# Patient Record
Sex: MALE | Race: WHITE | NOT HISPANIC OR LATINO | Employment: FULL TIME | URBAN - METROPOLITAN AREA
[De-identification: names, ages, dates, MRNs, and addresses within clinical notes are randomized per-mention and may not be internally consistent; named-entity substitution may affect disease eponyms.]

---

## 2017-11-22 DIAGNOSIS — R05.9 COUGH: ICD-10-CM

## 2017-11-24 RX ORDER — FLUTICASONE PROPIONATE 50 MCG
1-2 SPRAY, SUSPENSION (ML) NASAL DAILY
Qty: 48 G | Refills: 0 | Status: SHIPPED | OUTPATIENT
Start: 2017-11-24 | End: 2018-02-26

## 2017-11-24 NOTE — TELEPHONE ENCOUNTER
Routing refill request to provider for review/approval because:  Drug not on the [facility] refill protocol for indicated use (cough)  Sunita FOREMAN RN            Requested Prescriptions   Pending Prescriptions Disp Refills     fluticasone (FLONASE) 50 MCG/ACT spray 48 g 1     Sig: Spray 1-2 sprays into both nostrils daily    Inhaled Steroids Protocol Passed    11/22/2017  8:30 PM       Passed - Patient is age 12 or older       Passed - Recent or future visit with authorizing provider's specialty    Patient had office visit in the last year or has a visit in the next 30 days with authorizing provider.  See chart review.

## 2018-02-19 DIAGNOSIS — Z00.00 ROUTINE GENERAL MEDICAL EXAMINATION AT A HEALTH CARE FACILITY: Primary | ICD-10-CM

## 2018-02-19 LAB
ERYTHROCYTE [DISTWIDTH] IN BLOOD BY AUTOMATED COUNT: 15.2 % (ref 10–15)
HCT VFR BLD AUTO: 52.3 % (ref 40–53)
HGB BLD-MCNC: 17.4 G/DL (ref 13.3–17.7)
MCH RBC QN AUTO: 28.7 PG (ref 26.5–33)
MCHC RBC AUTO-ENTMCNC: 33.3 G/DL (ref 31.5–36.5)
MCV RBC AUTO: 86 FL (ref 78–100)
PLATELET # BLD AUTO: 178 10E9/L (ref 150–450)
RBC # BLD AUTO: 6.06 10E12/L (ref 4.4–5.9)
WBC # BLD AUTO: 5.8 10E9/L (ref 4–11)

## 2018-02-19 PROCEDURE — 36415 COLL VENOUS BLD VENIPUNCTURE: CPT | Performed by: INTERNAL MEDICINE

## 2018-02-19 PROCEDURE — 80061 LIPID PANEL: CPT | Performed by: INTERNAL MEDICINE

## 2018-02-19 PROCEDURE — 80053 COMPREHEN METABOLIC PANEL: CPT | Performed by: INTERNAL MEDICINE

## 2018-02-19 PROCEDURE — 85027 COMPLETE CBC AUTOMATED: CPT | Performed by: INTERNAL MEDICINE

## 2018-02-19 NOTE — PROGRESS NOTES
Dr. Pineda - this patient walked in wanting labs for his pe next week, can you order?  Lab litzy several vials.  Inna Delgadillo, CMA

## 2018-02-20 LAB
ALBUMIN SERPL-MCNC: 4.1 G/DL (ref 3.4–5)
ALP SERPL-CCNC: 69 U/L (ref 40–150)
ALT SERPL W P-5'-P-CCNC: 45 U/L (ref 0–70)
ANION GAP SERPL CALCULATED.3IONS-SCNC: 9 MMOL/L (ref 3–14)
AST SERPL W P-5'-P-CCNC: 31 U/L (ref 0–45)
BILIRUB SERPL-MCNC: 0.5 MG/DL (ref 0.2–1.3)
BUN SERPL-MCNC: 21 MG/DL (ref 7–30)
CALCIUM SERPL-MCNC: 9.1 MG/DL (ref 8.5–10.1)
CHLORIDE SERPL-SCNC: 106 MMOL/L (ref 94–109)
CHOLEST SERPL-MCNC: 163 MG/DL
CO2 SERPL-SCNC: 26 MMOL/L (ref 20–32)
CREAT SERPL-MCNC: 1.04 MG/DL (ref 0.66–1.25)
GFR SERPL CREATININE-BSD FRML MDRD: 73 ML/MIN/1.7M2
GLUCOSE SERPL-MCNC: 89 MG/DL (ref 70–99)
HDLC SERPL-MCNC: 76 MG/DL
LDLC SERPL CALC-MCNC: 79 MG/DL
NONHDLC SERPL-MCNC: 87 MG/DL
POTASSIUM SERPL-SCNC: 4.4 MMOL/L (ref 3.4–5.3)
PROT SERPL-MCNC: 7.3 G/DL (ref 6.8–8.8)
SODIUM SERPL-SCNC: 141 MMOL/L (ref 133–144)
TRIGL SERPL-MCNC: 42 MG/DL

## 2018-02-26 ENCOUNTER — OFFICE VISIT (OUTPATIENT)
Dept: FAMILY MEDICINE | Facility: CLINIC | Age: 58
End: 2018-02-26
Payer: COMMERCIAL

## 2018-02-26 VITALS
HEIGHT: 69 IN | SYSTOLIC BLOOD PRESSURE: 126 MMHG | WEIGHT: 220 LBS | DIASTOLIC BLOOD PRESSURE: 78 MMHG | BODY MASS INDEX: 32.58 KG/M2 | OXYGEN SATURATION: 97 % | TEMPERATURE: 98.2 F | HEART RATE: 78 BPM

## 2018-02-26 DIAGNOSIS — R05.9 COUGH: ICD-10-CM

## 2018-02-26 DIAGNOSIS — I77.1 SUBCLAVIAN ARTERY STENOSIS, LEFT (H): ICD-10-CM

## 2018-02-26 DIAGNOSIS — I48.92 ATRIAL FLUTTER, UNSPECIFIED TYPE (H): ICD-10-CM

## 2018-02-26 DIAGNOSIS — Z00.00 ROUTINE GENERAL MEDICAL EXAMINATION AT A HEALTH CARE FACILITY: Primary | ICD-10-CM

## 2018-02-26 DIAGNOSIS — I71.21 ASCENDING AORTIC ANEURYSM (H): ICD-10-CM

## 2018-02-26 DIAGNOSIS — I10 BENIGN ESSENTIAL HYPERTENSION: ICD-10-CM

## 2018-02-26 DIAGNOSIS — I77.810 AORTIC ROOT DILATATION (H): ICD-10-CM

## 2018-02-26 DIAGNOSIS — R05.3 CHRONIC COUGH: ICD-10-CM

## 2018-02-26 DIAGNOSIS — B07.0 PLANTAR WARTS: ICD-10-CM

## 2018-02-26 DIAGNOSIS — K21.9 GASTROESOPHAGEAL REFLUX DISEASE WITHOUT ESOPHAGITIS: ICD-10-CM

## 2018-02-26 PROCEDURE — 99396 PREV VISIT EST AGE 40-64: CPT | Performed by: INTERNAL MEDICINE

## 2018-02-26 RX ORDER — AMLODIPINE BESYLATE 5 MG/1
5 TABLET ORAL DAILY
Qty: 90 TABLET | Refills: 3 | Status: SHIPPED | OUTPATIENT
Start: 2018-02-26 | End: 2019-02-18

## 2018-02-26 NOTE — PROGRESS NOTES
SUBJECTIVE:   CC: Kelvin Zhou is an 58 year old male who presents for preventative health visit.     The patient feels fine but not working out reg and weight is up as noted.  He has plantar wart for long time right foot and wants to get it taken care of.  No cv c/o, blood pressure fine on his checks.      As noted seen at [facility] Jan 2016 for echo findings, heart block and short lived atrial flutter and she rec follow up echo and holter in 2 years.        Healthy Habits:    Do you get at least three servings of calcium containing foods daily (dairy, green leafy vegetables, etc.)? yes    Amount of exercise or daily activities, outside of work: 3 day(s) per week    Problems taking medications regularly No    Medication side effects: No    Have you had an eye exam in the past two years? yes    Do you see a dentist twice per year? yes    Do you have sleep apnea, excessive snoring or daytime drowsiness?no           Today's PHQ-2 Score:   PHQ-2 ( 1999 [employer]) 12/15/2016 12/13/2016   Q1: Little interest or pleasure in doing things 0 -   Q2: Feeling down, depressed or hopeless 0 -   PHQ-2 Score 0 -   Q1: Little interest or pleasure in doing things - Not at all   Q2: Feeling down, depressed or hopeless - Not at all   PHQ-2 Score - 0       Abuse: Current or Past(Physical, Sexual or Emotional)- No  Do you feel safe in your environment - Yes    Social History   Substance Use Topics     Smoking status: Never Smoker     Smokeless tobacco: Never Used     Alcohol use No      If you drink alcohol do you typically have >3 drinks per day or >7 drinks per week? No                Past Medical History:      Past Medical History:   Diagnosis Date     Aortic root dilatation (H) 2012    echo done 9/12 4.2 cm, ascending aorta mildy dilated at 3.9.  Nl lv fxn, mild lvh, lae, mild ai, mild mr and tr     Ascending aortic aneurysm (H) 2012    echo done 9/12 mildy dilated at 4.2.     Atrial flutter (H) 12/15    seen on holter, 5 minutes,  holter done due to prior heart surgery, also 2:1 heart block and first degree ab block     Chronic cough     for years     Cough      Diastolic dysfunction 12/15    on echo     GERD (gastroesophageal reflux disease)      Heart block 12/15    seen on holter, 2:1 and first degree     HTN (hypertension) 2007     LVH (left ventricular hypertrophy) 2006    on echo done for elev bp, fu 2010 same, mod thickening mv, mild tr, trace to mild mr; fu 2015 same as 2013     Mild aortic regurgitation      Mild mitral regurgitation     fu 2015 no change     Nephrolithiasis 2007    calcium ox     Normal colonoscopy 2014     Personal history of surgery to heart and great vessels, presenting hazards to health 7th grade    hole in heart     Subclavian artery stenosis, left (H) 2006    found due to bp difference in arms             Past Surgical History:      Past Surgical History:   Procedure Laterality Date     CARDIAC SURGERY  7th grade     hole in heart     LAPAROSCOPIC CHOLECYSTECTOMY  2/8/15    done gillian akbar gallbladder             Social History:     Social History     Social History     Marital status:      Spouse name: N/A     Number of children: 2     Years of education: N/A     Occupational History     [employer]     Social History Main Topics     Smoking status: Never Smoker     Smokeless tobacco: Never Used     Alcohol use No     Drug use: No     Sexual activity: Yes     Partners: Female     Other Topics Concern     Not on file     Social History Narrative             Family History:   reviewed         Allergies:     Allergies   Allergen Reactions     No Known Drug Allergy              Medications:     Current Outpatient Prescriptions   Medication Sig Dispense Refill     amLODIPine (NORVASC) 5 MG tablet Take 1 tablet (5 mg) by mouth daily 90 tablet 3     ranitidine (ZANTAC) 150 MG tablet Take 1 tablet (150 mg) by mouth 2 times daily 180 tablet 3     fluticasone (FLONASE) 50 MCG/ACT spray Spray 1-2  "sprays into both nostrils daily 48 g 0     aspirin 81 MG tablet Take 1 tablet (81 mg) by mouth daily 30 tablet 9     [DISCONTINUED] amLODIPine (NORVASC) 5 MG tablet Take 1 tablet (5 mg) by mouth daily 90 tablet 3               Review of Systems:   The 10 point Review of Systems is negative other than noted in the HPI           Physical Exam:   Blood pressure 126/78, pulse 78, temperature 98.2  F (36.8  C), temperature source Oral, height 5' 9\" (1.753 m), weight 220 lb (99.8 kg), SpO2 97 %.    Exam:  Constitutional: healthy appearing, alert and in no distress  Heent: Normocephalic. Head without obvious masses or lesions. PERRLDC, EOMI. Mouth exam within normal limits: tongue, mucous membranes, posterior pharynx all normal, no lesions or abnormalities seen.  Tm's and canals within normal limits bilaterally. Neck supple, no nuchal rigidity or masses. No supraclavicular, or cervical adenopathy. Thyroid symmetric, no masses.  Cardiovascular: Regular rate and rhythm, occasional extra beat, no murmer, rub or gallops.  JVP not elevated, no edema.  Carotids within normal limits bilaterally, no bruits.  Respiratory: Normal respiratory effort.  Lungs clear, normal flow, no wheezing or crackles.  Breasts: Normal bilaterally.  No masses or lesions.  Nipples within normal limites.  No axillary lesions or nodes.  Gastrointestinal: Normal active bowel sounds.   Soft, not tender, no masses, guarding or rebound.  No hepatosplenomegaly.   Genitourinary: Rectal min bph  Musculoskeletal: extremities normal, no gross deformities noted.  Skin: no suspicious lesions or rashes, has plantar wart bottom right foot that I froze with l.n today.  Neurologic: Mental status within normal limits.  Speech fluent.  No gross motor abnormalities and gait intact.  Psychiatric: mentation appears normal and affect normal.         Data:   Labs reviewed with patient         Assessment:   1. Normal complete physical exam  2. Plantars wart, follow up " podiatrist  3. Atrial flutter, no clinical recurrence, per cards rec will get holter  4. Aortic root dil, to get echo  5. Subclavian stenosis, to check blood pressure on right arm  6. Hypertension, controlled  7. Chronic cough for years, will refer to Dr. Diallo  8. Gerd, controlled  9. Obesity, weight loss         Plan:   holter  Echo  Follow up pod for wart  Exercise, diet and weight loss  See Dr. Diallo for cough  Call if problems      Shad Pineda M.D.

## 2018-02-26 NOTE — PATIENT INSTRUCTIONS
For the cough I would see Dr Diallo, [phone]    Try to exercise 4x a week and get your weight down    Call if problems    Shad Pineda M.D.      Preventive Health Recommendations  Male Ages 50 - 64    Yearly exam:             See your health care provider every year in order to  o   Review health changes.   o   Discuss preventive care.    o   Review your medicines if your doctor has prescribed any.     Have a cholesterol test every 5 years, or more frequently if you are at risk for high cholesterol/heart disease.     Have a diabetes test (fasting glucose) every three years. If you are at risk for diabetes, you should have this test more often.     Have a colonoscopy at age 50, or have a yearly FIT test (stool test). These exams will check for colon cancer.      Talk with your health care provider about whether or not a prostate cancer screening test (PSA) is right for you.    You should be tested each year for STDs (sexually transmitted diseases), if you re at risk.     Shots: Get a flu shot each year. Get a tetanus shot every 10 years.     Nutrition:    Eat at least 5 servings of fruits and vegetables daily.     Eat whole-grain bread, whole-wheat pasta and brown rice instead of white grains and rice.     Talk to your provider about Calcium and Vitamin D.     Lifestyle    Exercise for at least 150 minutes a week (30 minutes a day, 5 days a week). This will help you control your weight and prevent disease.     Limit alcohol to one drink per day.     No smoking.     Wear sunscreen to prevent skin cancer.     See your dentist every six months for an exam and cleaning.     See your eye doctor every 1 to 2 years.

## 2018-02-26 NOTE — NURSING NOTE
"Chief Complaint   Patient presents with     Physical       Initial /85  Pulse 78  Temp 98.2  F (36.8  C) (Oral)  Ht 5' 9\" (1.753 m)  Wt 220 lb (99.8 kg)  SpO2 97%  BMI 32.49 kg/m2 Estimated body mass index is 32.49 kg/(m^2) as calculated from the following:    Height as of this encounter: 5' 9\" (1.753 m).    Weight as of this encounter: 220 lb (99.8 kg).  Medication Reconciliation: complete   Mckenna Espinal CMA      "

## 2018-02-26 NOTE — TELEPHONE ENCOUNTER
Reason for Call:  Other prescription    Detailed comments: Pt called this afternoon and said that Dr. Pineda forgot to refill his flonase. Please put in a refill for that ASAP and give pt a call once this is done. Thank you.    Phone Number Patient can be reached at: Home number on file [phone] (home)    Best Time:     Can we leave a detailed message on this number? YES    Call taken on 2/26/2018 at 4:07 PM by Laura Wilson

## 2018-02-26 NOTE — MR AVS SNAPSHOT
After Visit Summary   2/26/2018    Kelvin Zhou    MRN: [id number]           Patient Information     Date Of Birth          1960        Visit Information        Provider Department      2/26/2018 3:00 PM Shad Pineda MD [facility]        Today's Diagnoses     Routine general medical examination at a health care facility    -  1    Benign essential hypertension        Gastroesophageal reflux disease without esophagitis        Subclavian artery stenosis, left (H)        Aortic root dilatation (H)        Ascending aortic aneurysm (H)        Atrial flutter, unspecified type (H)        Chronic cough          Care Instructions    For the cough I would see Dr Diallo, [phone]    Try to exercise 4x a week and get your weight down    Call if problems    Shad Pineda M.D.      Preventive Health Recommendations  Male Ages 50 - 64    Yearly exam:             See your health care provider every year in order to  o   Review health changes.   o   Discuss preventive care.    o   Review your medicines if your doctor has prescribed any.     Have a cholesterol test every 5 years, or more frequently if you are at risk for high cholesterol/heart disease.     Have a diabetes test (fasting glucose) every three years. If you are at risk for diabetes, you should have this test more often.     Have a colonoscopy at age 50, or have a yearly FIT test (stool test). These exams will check for colon cancer.      Talk with your health care provider about whether or not a prostate cancer screening test (PSA) is right for you.    You should be tested each year for STDs (sexually transmitted diseases), if you re at risk.     Shots: Get a flu shot each year. Get a tetanus shot every 10 years.     Nutrition:    Eat at least 5 servings of fruits and vegetables daily.     Eat whole-grain bread, whole-wheat pasta and brown rice instead of white grains and rice.     Talk to your provider about Calcium and  "Vitamin D.     Lifestyle    Exercise for at least 150 minutes a week (30 minutes a day, 5 days a week). This will help you control your weight and prevent disease.     Limit alcohol to one drink per day.     No smoking.     Wear sunscreen to prevent skin cancer.     See your dentist every six months for an exam and cleaning.     See your eye doctor every 1 to 2 years.            Follow-ups after your visit        Who to contact     If you have questions or need follow up information about today's clinic visit or your schedule please contact [facility] directly at [phone].  Normal or non-critical lab and imaging results will be communicated to you by [employer], letter or phone within 4 business days after the clinic has received the results. If you do not hear from us within 7 days, please contact the clinic through [employer] or phone. If you have a critical or abnormal lab result, we will notify you by phone as soon as possible.  Submit refill requests through [employer] or call your pharmacy and they will forward the refill request to us. Please allow 3 business days for your refill to be completed.          Additional Information About Your Visit        MyChart Information     [employer] gives you secure access to your electronic health record. If you see a primary care provider, you can also send messages to your care team and make appointments. If you have questions, please call your primary care clinic.  If you do not have a primary care provider, please call [phone] and they will assist you.        Care EveryWhere ID     This is your Care EveryWhere ID. This could be used by other organizations to access your [place] medical records  [id number]        Your Vitals Were     Pulse Temperature Height Pulse Oximetry BMI (Body Mass Index)       78 98.2  F (36.8  C) (Oral) 5' 9\" (1.753 m) 97% 32.49 kg/m2        Blood Pressure from Last 3 Encounters:   02/26/18 126/78   12/15/16 139/76   12/07/15 118/84 "    Weight from Last 3 Encounters:   02/26/18 220 lb (99.8 kg)   12/15/16 208 lb (94.3 kg)   12/07/15 213 lb 8 oz (96.8 kg)              Today, you had the following     No orders found for display         Today's Medication Changes          These changes are accurate as of 2/26/18  3:24 PM.  If you have any questions, ask your nurse or doctor.               Stop taking these medicines if you haven't already. Please contact your care team if you have questions.     omeprazole 20 MG CR capsule   Commonly known as:  priLOSEC   Stopped by:  Shad Pineda MD                Where to get your medicines      These medications were sent to [id number] - [facility] [address] [facility]  [address] [facility] [address]     Phone:  [phone]     amLODIPine 5 MG tablet    ranitidine 150 MG tablet                Primary Care Provider Office Phone # Fax #    Shad Pineda [phone] [address] [address]        Equal Access to Services     [facility]: Hadii aad ku hadasho Soomaali, waaxda luqadaha, qaybta kaalmada adehelenyada, kimmy mercado . So [facility] [place] [phone].    ATENCIÓN: Si habla español, tiene a segundo disposición servicios gratuitos de asistencia lingüística. [facility] [phone].    We comply with applicable federal civil rights laws and [place] laws. We do not discriminate on the basis of race, color, national origin, age, disability, sex, sexual orientation, or gender identity.            Thank you!     Thank you for choosing [facility]  for your care. Our goal is always to provide you with excellent care. Hearing back from our patients is one way we can continue to improve our services. Please take a few minutes to complete the written survey that you may receive in the mail after your visit with us. Thank you!             Your Updated Medication List - Protect others around you: Learn how to  safely use, store and throw away your medicines at www.disposemymeds.org.          This list is accurate as of 2/26/18  3:24 PM.  Always use your most recent med list.                   Brand Name Dispense Instructions for use Diagnosis    amLODIPine 5 MG tablet    NORVASC    90 tablet    Take 1 tablet (5 mg) by mouth daily    Benign essential hypertension       aspirin 81 MG tablet     30 tablet    Take 1 tablet (81 mg) by mouth daily    Subclavian artery stenosis, left (H)       fluticasone 50 MCG/ACT spray    FLONASE    48 g    Spray 1-2 sprays into both nostrils daily    Cough       ranitidine 150 MG tablet    ZANTAC    180 tablet    Take 1 tablet (150 mg) by mouth 2 times daily    Gastroesophageal reflux disease without esophagitis

## 2018-02-27 RX ORDER — FLUTICASONE PROPIONATE 50 MCG
1-2 SPRAY, SUSPENSION (ML) NASAL DAILY
Qty: 48 G | Refills: 3 | Status: SHIPPED | OUTPATIENT
Start: 2018-02-27 | End: 2020-02-17

## 2018-02-27 NOTE — TELEPHONE ENCOUNTER
"Last Written Prescription Date:  11/24/2017  Last Fill Quantity: 45g,  # refills: 0   Last office visit: 2/26/2018 with prescribing provider:     Future Office Visit:      Requested Prescriptions   Pending Prescriptions Disp Refills     fluticasone (FLONASE) 50 MCG/ACT spray 48 g 0     Sig: Spray 1-2 sprays into both nostrils daily    Inhaled Steroids Protocol Passed    2/26/2018  4:08 PM       Passed - Patient is age 12 or older       Passed - Recent or future visit with authorizing provider's specialty    Patient had office visit in the last year or has a visit in the next 30 days with authorizing provider.  See \"Patient Info\" tab in inbasket, or \"Choose Columns\" in Meds & Orders section of the refill encounter.               "

## 2018-02-27 NOTE — TELEPHONE ENCOUNTER
"Prescription approved per [facility] Refill Protocol.  LVM \"Your Rx has been sent\"     Sunita FOREMAN RN    "

## 2018-03-08 ENCOUNTER — TELEPHONE (OUTPATIENT)
Dept: FAMILY MEDICINE | Facility: CLINIC | Age: 58
End: 2018-03-08

## 2018-03-08 NOTE — TELEPHONE ENCOUNTER
Please call patient re mychart message I recently sent.  He never answered it.  Please see if ok to do those studies.    Thanks    Shad Pineda M.D.

## 2018-03-08 NOTE — TELEPHONE ENCOUNTER
Dr. Pineda:     Spoke with patient who agrees to contact his Cardiologist at [place] to schedule ECHO and Holter monitor. Asked that he do this as soon as possible, as 2 years was advised. He agreed with plan.     Asked that he reach out to us with any further questions or concerns.     Thank you,   Eva EDWARDS RN

## 2018-03-08 NOTE — TELEPHONE ENCOUNTER
Spoke with patient:     He declines the sleep apnea test, but would consider the Echo and Holter monitoring.     He is not sure whether he should do these at [place] or here, within [place]. Advised that PCP would likely order through .     Dr. Pineda, the patient states he will check his MyChart. Would you be willing to respond with advise on whether he can have this done at [place] or ?     Thank you,   Eva EDWARDS RN

## 2018-04-13 ENCOUNTER — HOSPITAL ENCOUNTER (OUTPATIENT)
Dept: CARDIOLOGY | Facility: CLINIC | Age: 58
Discharge: HOME | End: 2018-04-13
Attending: INTERNAL MEDICINE | Admitting: INTERNAL MEDICINE
Payer: COMMERCIAL

## 2018-04-13 ENCOUNTER — HOSPITAL ENCOUNTER (OUTPATIENT)
Dept: CARDIOLOGY | Facility: CLINIC | Age: 58
End: 2018-04-13
Attending: INTERNAL MEDICINE
Payer: COMMERCIAL

## 2018-04-13 DIAGNOSIS — I51.89 DIASTOLIC DYSFUNCTION: ICD-10-CM

## 2018-04-13 DIAGNOSIS — I71.21 ASCENDING AORTIC ANEURYSM (H): ICD-10-CM

## 2018-04-13 DIAGNOSIS — I34.0 MILD MITRAL REGURGITATION: ICD-10-CM

## 2018-04-13 DIAGNOSIS — I45.9 HEART BLOCK: ICD-10-CM

## 2018-04-13 PROCEDURE — 93225 XTRNL ECG REC<48 HRS REC: CPT

## 2018-04-13 PROCEDURE — 93227 XTRNL ECG REC<48 HR R&I: CPT | Performed by: INTERNAL MEDICINE

## 2018-04-13 PROCEDURE — 93306 TTE W/DOPPLER COMPLETE: CPT

## 2018-04-13 PROCEDURE — 93306 TTE W/DOPPLER COMPLETE: CPT | Mod: 26 | Performed by: INTERNAL MEDICINE

## 2018-04-20 LAB — INTERPRETATION MONITOR -MUSE: NORMAL

## 2018-04-22 ENCOUNTER — TELEPHONE (OUTPATIENT)
Dept: FAMILY MEDICINE | Facility: CLINIC | Age: 58
End: 2018-04-22

## 2018-04-22 PROBLEM — I48.91 ATRIAL FIBRILLATION (H): Status: ACTIVE | Noted: 2018-04-01

## 2018-04-22 NOTE — TELEPHONE ENCOUNTER
I called and discussed with patient his tests, has afib needs to meet with ep doctor.  His so Uli will call me back as he is peds cardiologist.    Shad Pineda M.D.    I spokek with Uli, he will arrange ep consult at [place]    Shad Pineda M.D.

## 2018-05-01 ENCOUNTER — TRANSFERRED RECORDS (OUTPATIENT)
Dept: HEALTH INFORMATION MANAGEMENT | Facility: CLINIC | Age: 58
End: 2018-05-01

## 2018-09-18 ENCOUNTER — TELEPHONE (OUTPATIENT)
Dept: FAMILY MEDICINE | Facility: CLINIC | Age: 58
End: 2018-09-18

## 2018-09-18 ENCOUNTER — OFFICE VISIT (OUTPATIENT)
Dept: FAMILY MEDICINE | Facility: CLINIC | Age: 58
End: 2018-09-18
Payer: COMMERCIAL

## 2018-09-18 VITALS
WEIGHT: 230 LBS | TEMPERATURE: 97.8 F | HEART RATE: 90 BPM | DIASTOLIC BLOOD PRESSURE: 78 MMHG | SYSTOLIC BLOOD PRESSURE: 136 MMHG | OXYGEN SATURATION: 96 % | BODY MASS INDEX: 34.07 KG/M2 | HEIGHT: 69 IN

## 2018-09-18 DIAGNOSIS — R00.2 PALPITATIONS: Primary | ICD-10-CM

## 2018-09-18 DIAGNOSIS — I10 BENIGN ESSENTIAL HYPERTENSION: ICD-10-CM

## 2018-09-18 DIAGNOSIS — I48.21 PERMANENT ATRIAL FIBRILLATION (H): ICD-10-CM

## 2018-09-18 LAB
ANION GAP SERPL CALCULATED.3IONS-SCNC: 4 MMOL/L (ref 3–14)
BUN SERPL-MCNC: 20 MG/DL (ref 7–30)
CALCIUM SERPL-MCNC: 9 MG/DL (ref 8.5–10.1)
CHLORIDE SERPL-SCNC: 107 MMOL/L (ref 94–109)
CO2 SERPL-SCNC: 30 MMOL/L (ref 20–32)
CREAT SERPL-MCNC: 1.01 MG/DL (ref 0.66–1.25)
ERYTHROCYTE [DISTWIDTH] IN BLOOD BY AUTOMATED COUNT: 14.4 % (ref 10–15)
GFR SERPL CREATININE-BSD FRML MDRD: 75 ML/MIN/1.7M2
GLUCOSE SERPL-MCNC: 82 MG/DL (ref 70–99)
HCT VFR BLD AUTO: 49.9 % (ref 40–53)
HGB BLD-MCNC: 16.7 G/DL (ref 13.3–17.7)
MCH RBC QN AUTO: 28.6 PG (ref 26.5–33)
MCHC RBC AUTO-ENTMCNC: 33.5 G/DL (ref 31.5–36.5)
MCV RBC AUTO: 86 FL (ref 78–100)
PLATELET # BLD AUTO: 175 10E9/L (ref 150–450)
POTASSIUM SERPL-SCNC: 4.4 MMOL/L (ref 3.4–5.3)
RBC # BLD AUTO: 5.83 10E12/L (ref 4.4–5.9)
SODIUM SERPL-SCNC: 141 MMOL/L (ref 133–144)
TROPONIN I SERPL-MCNC: <0.015 UG/L (ref 0–0.04)
TSH SERPL DL<=0.005 MIU/L-ACNC: 1.82 MU/L (ref 0.4–4)
WBC # BLD AUTO: 6.3 10E9/L (ref 4–11)

## 2018-09-18 PROCEDURE — 84484 ASSAY OF TROPONIN QUANT: CPT | Performed by: INTERNAL MEDICINE

## 2018-09-18 PROCEDURE — 85027 COMPLETE CBC AUTOMATED: CPT | Performed by: INTERNAL MEDICINE

## 2018-09-18 PROCEDURE — 80048 BASIC METABOLIC PNL TOTAL CA: CPT | Performed by: INTERNAL MEDICINE

## 2018-09-18 PROCEDURE — 84443 ASSAY THYROID STIM HORMONE: CPT | Performed by: INTERNAL MEDICINE

## 2018-09-18 PROCEDURE — 99215 OFFICE O/P EST HI 40 MIN: CPT | Performed by: INTERNAL MEDICINE

## 2018-09-18 PROCEDURE — 93000 ELECTROCARDIOGRAM COMPLETE: CPT | Performed by: INTERNAL MEDICINE

## 2018-09-18 PROCEDURE — 36415 COLL VENOUS BLD VENIPUNCTURE: CPT | Performed by: INTERNAL MEDICINE

## 2018-09-18 RX ORDER — METOPROLOL SUCCINATE 50 MG/1
50 TABLET, EXTENDED RELEASE ORAL DAILY
Qty: 90 TABLET | Refills: 3 | Status: SHIPPED | OUTPATIENT
Start: 2018-09-18 | End: 2019-02-28

## 2018-09-18 RX ORDER — METOPROLOL SUCCINATE 50 MG/1
50 TABLET, EXTENDED RELEASE ORAL DAILY
Qty: 90 TABLET | Refills: 3 | Status: SHIPPED | OUTPATIENT
Start: 2018-09-18 | End: 2018-09-18

## 2018-09-18 NOTE — MR AVS SNAPSHOT
After Visit Summary   9/18/2018    Kelvin Zhou    MRN: [id number]           Patient Information     Date Of Birth          1960        Visit Information        Provider Department      9/18/2018 8:15 AM Shad Pineda MD [facility]        Today's Diagnoses     Palpitations    -  1    Permanent atrial fibrillation (H)        Benign essential hypertension          Care Instructions    Start the metoprolol and take 1 daily in the morning, change the amlodipine to night time.  Call if you have dizziness, chest pain or shortness of breath.  See me in one week, Tuesday at 2:15      Shad Pineda M.D.            Follow-ups after your visit        Who to contact     If you have questions or need follow up information about today's clinic visit or your schedule please contact [facility] directly at [phone].  Normal or non-critical lab and imaging results will be communicated to you by Invisible Puppyhart, letter or phone within 4 business days after the clinic has received the results. If you do not hear from us within 7 days, please contact the clinic through Invisible Puppyhart or phone. If you have a critical or abnormal lab result, we will notify you by phone as soon as possible.  Submit refill requests through Yakify or call your pharmacy and they will forward the refill request to us. Please allow 3 business days for your refill to be completed.          Additional Information About Your Visit        MyChart Information     Yakify gives you secure access to your electronic health record. If you see a primary care provider, you can also send messages to your care team and make appointments. If you have questions, please call your primary care clinic.  If you do not have a primary care provider, please call [phone] and they will assist you.        Care EveryWhere ID     This is your Care EveryWhere ID. This could be used by other organizations to access your [facility]  "records  [id number]        Your Vitals Were     Pulse Temperature Height Pulse Oximetry BMI (Body Mass Index)       90 97.8  F (36.6  C) (Oral) 5' 9\" (1.753 m) 96% 33.97 kg/m2        Blood Pressure from Last 3 Encounters:   09/18/18 136/78   02/26/18 126/78   12/15/16 139/76    Weight from Last 3 Encounters:   09/18/18 230 lb (104.3 kg)   02/26/18 220 lb (99.8 kg)   12/15/16 208 lb (94.3 kg)              We Performed the Following     Basic metabolic panel     CBC with platelets     EKG 12-lead complete w/read - Clinics     Troponin I     TSH with free T4 reflex          Today's Medication Changes          These changes are accurate as of 9/18/18 10:00 AM.  If you have any questions, ask your nurse or doctor.               Start taking these medicines.        Dose/Directions    ASPIRIN NOT PRESCRIBED   Commonly known as:  INTENTIONAL   Used for:  Permanent atrial fibrillation (H)   Started by:  Shad Pineda MD        Please choose reason not prescribed, below   Quantity:  0 each   Refills:  0       metoprolol succinate 50 MG 24 hr tablet   Commonly known as:  TOPROL-XL   Used for:  Permanent atrial fibrillation (H), Benign essential hypertension   Started by:  Shad Pineda MD        Dose:  50 mg   Take 1 tablet (50 mg) by mouth daily   Quantity:  90 tablet   Refills:  3         Stop taking these medicines if you haven't already. Please contact your care team if you have questions.     aspirin 81 MG tablet   Stopped by:  Shad Pineda MD                Where to get your medicines      These medications were sent to [id number] - [address]  [address] [place] [address]     Phone:  [phone]     metoprolol succinate 50 MG 24 hr tablet         Some of these will need a paper prescription and others can be bought over the counter.  Ask your nurse if you have questions.     You don't need a prescription for these medications     ASPIRIN NOT " PRESCRIBED                Primary Care Provider Office Phone # Fax #    Shad Pineda [phone] [address] [address]        Equal Access to Services     TIANA FRASER : Hadii aad ku hadkarelo Soomaali, waaxda luqadaha, qaybta kaalmada adeegyada, kimmy colonn sana sheppard laKeilaana lilia mcghee. So [facility] [phone].    ATENCIÓN: Si habla español, tiene a segundo disposición servicios gratuitos de asistencia lingüística. Llame al [phone].    We comply with applicable federal civil rights laws and [place] laws. We do not discriminate on the basis of race, color, national origin, age, disability, sex, sexual orientation, or gender identity.            Thank you!     Thank you for choosing [facility]  for your care. Our goal is always to provide you with excellent care. Hearing back from our patients is one way we can continue to improve our services. Please take a few minutes to complete the written survey that you may receive in the mail after your visit with us. Thank you!             Your Updated Medication List - Protect others around you: Learn how to safely use, store and throw away your medicines at www.disposemymeds.org.          This list is accurate as of 9/18/18 10:00 AM.  Always use your most recent med list.                   Brand Name Dispense Instructions for use Diagnosis    amLODIPine 5 MG tablet    NORVASC    90 tablet    Take 1 tablet (5 mg) by mouth daily    Benign essential hypertension       ASPIRIN NOT PRESCRIBED    INTENTIONAL    0 each    Please choose reason not prescribed, below    Permanent atrial fibrillation (H)       ELIQUIS PO       Palpitations       fluticasone 50 MCG/ACT spray    FLONASE    48 g    Spray 1-2 sprays into both nostrils daily    Cough       metoprolol succinate 50 MG 24 hr tablet    TOPROL-XL    90 tablet    Take 1 tablet (50 mg) by mouth daily    Permanent atrial fibrillation (H), Benign essential hypertension        ranitidine 150 MG tablet    ZANTAC    180 tablet    Take 1 tablet (150 mg) by mouth 2 times daily    Gastroesophageal reflux disease without esophagitis

## 2018-09-18 NOTE — PROGRESS NOTES
The patient is seen as an urgent workup for heart symptoms.  He notes for the last 2 days his heart is feeling different, faster, beating harder, pulsing in his neck and overall he is just not feeling very well.  His symptoms do come and go and he was able to work yesterday without difficulty.  He has not had more stress and does not drink or use caffeine.  It is most noticeable when he is at rest.  She is not having chest pain or shortness of breath.  Slight dizziness but no fainting.  Slight headache.  He has had a cough for years without change and no cold symptoms.  No chest pain, PND or edema.  No GI or  symptoms.  No fevers or night sweats.    He does have a cardiovascular history as noted including atrial fibrillation which is not new.  He is on Eliquis for this and has also been taking aspirin which I suggested he stop.  He has no history of blood clots.    Past Medical History:   Diagnosis Date     Aortic root dilatation (H) 2012    echo done 9/12 4.2 cm, ascending aorta mildy dilated at 3.9.  Nl lv fxn, mild lvh, lae, mild ai, mild mr and tr     Ascending aortic aneurysm (H) 2012    echo done 9/12 mildy dilated at 4.2.     Atrial fibrillation (H) 04/2018    seen on holter     Atrial flutter (H) 12/2015    seen on holter, 5 minutes, holter done due to prior heart surgery, also 2:1 heart block and first degree av block     Chronic cough     for years     Cough      Diastolic dysfunction 12/15    on echo     GERD (gastroesophageal reflux disease)      Heart block 12/15    seen on holter, 2:1 and first degree     HTN (hypertension) 2007     LVH (left ventricular hypertrophy) 2006    on echo done for elev bp, fu 2010 same, mod thickening mv, mild tr, trace to mild mr; fu 2015 same as 2013     Mild aortic regurgitation      Mild mitral regurgitation     fu 2015 no change     Nephrolithiasis 2007    calcium ox     Normal colonoscopy 2014     Personal history of surgery to heart and great vessels, presenting  "hazards to health 7th grade    hole in heart     Subclavian artery stenosis, left (H) 2006    found due to bp difference in arms     Past Surgical History:   Procedure Laterality Date     CARDIAC SURGERY  7th grade     hole in heart     LAPAROSCOPIC CHOLECYSTECTOMY  2/8/15    done gillian akbar gallbladder     Social History     Social History     Marital status:      Spouse name: N/A     Number of children: 2     Years of education: N/A     Occupational History      [email or web address]     Social History Main Topics     Smoking status: Never Smoker     Smokeless tobacco: Never Used     Alcohol use No     Drug use: No     Sexual activity: Yes     Partners: Female     Other Topics Concern     Not on file     Social History Narrative     Current Outpatient Prescriptions   Medication Sig Dispense Refill     Apixaban (ELIQUIS PO)        ASPIRIN NOT PRESCRIBED (INTENTIONAL) Please choose reason not prescribed, below 0 each 0     amLODIPine (NORVASC) 5 MG tablet Take 1 tablet (5 mg) by mouth daily 90 tablet 3     fluticasone (FLONASE) 50 MCG/ACT spray Spray 1-2 sprays into both nostrils daily 48 g 3     ranitidine (ZANTAC) 150 MG tablet Take 1 tablet (150 mg) by mouth 2 times daily 180 tablet 3     Allergies   Allergen Reactions     No Known Drug Allergy      FAMILY HISTORY NOTED AND REVIEWED    REVIEW OF SYSTEMS: above    PHYSICAL EXAM    /78  Pulse 90  Temp 97.8  F (36.6  C) (Oral)  Ht 5' 9\" (1.753 m)  Wt 230 lb (104.3 kg)  SpO2 96%  BMI 33.97 kg/m2    Patient appears non toxic  Blood pressure same sitting and standing, pulse 90 done for 30 seconds  Mouth and eyes within normal limits  No tremor  No supraclavicular, cervical or axillary lymphadenopathy  Lungs clear, normal flow  cv irreg, irreg, no murmer, rub or gallop, no jvp or edema  Abdomen normal active bowel sounds, soft, non-tender, no mgr, no hepatosplenomegaly    Stat labs done and reviewed with patient, ekg - afib, occasionally pvc, " non specific st wave depression, c/w prior not significantly changed.    Of note had prior holter and had pvc's on that    I then re evaluated the patient and did a repeat exam:    Blood pressure 138/78, pulses the same    ASSESSMENT:  Palp with harder beats, neg exam and labs, tsh pending.  I believe this is likely due to pvc's on top of his afib, doubt cv ischemic, other rhythm x for afib, doubt neuro event, sepsis, gi bleed, etc.    Hypertension, control just fair    PLAN:  Start toprol 50mg today  Change norvasc to night time  Follow up on tsh  Call if not gone soon, new c/o, chest pain or shortness of breath  See me one week    Shad Pineda M.D.

## 2018-09-18 NOTE — TELEPHONE ENCOUNTER
Reason for call:  Patient reporting a symptom    Symptom or request: Pt reports his heart is beating oddly.  He does not report any pain.  It feels like it it beating faster.  He wonders if he needs a referral to the cardiology clinic, or an ekg.  Duration (how long have symptoms been present): two days    Have you been treated for this before? Yes    Additional comments:     Phone Number patient can be reached at:  Home number on file [phone] (home)    Best Time:  any    Can we leave a detailed message on this number:  YES    Call taken on 9/18/2018 at 7:32 AM by Alexandra Oliver

## 2018-09-18 NOTE — TELEPHONE ENCOUNTER
"Called home number back.  Wife reports \"He called me this morning said he's not feeling well, he's feeling dizzy, and I noticed last night during night, he seemed to be his heart was really hard and I felt it sleeping next to him.\"    Called pt's cell.  Pt reports \"Heart beat feels like it's changed, headache, woozier than usual.  Funny heart beat for a couple days.  Dizziness AM, feeling not quite right.  I can feel it up through my head when I sit.  My son is a cardiologist and told me to get \"    Denies chest neck jaw arm pain, denies vomiting, passing out, severe headache, slow heart rate, or other sx.Pt does have known afib, and did take eliquis last night and this am.Huddled with PCP, and pt will come in for appt in 20 min.  If any worsening sx, or any pain to call 911.    Pt agreed with plan.  Heather Grossman RN      "

## 2018-09-18 NOTE — PATIENT INSTRUCTIONS
Start the metoprolol and take 1 daily in the morning, change the amlodipine to night time.  Call if you have dizziness, chest pain or shortness of breath.  See me in one week, Tuesday at 2:15      Shad Pineda M.D.

## 2018-09-19 ENCOUNTER — TELEPHONE (OUTPATIENT)
Dept: FAMILY MEDICINE | Facility: CLINIC | Age: 58
End: 2018-09-19

## 2018-09-19 NOTE — TELEPHONE ENCOUNTER
Reason for Call:  Other patient request to send EKG results to [facility]    Detailed comments: patient had an EKG yesterday in clinic.  He is requesting that the results of the EKG be sent to his cardiologist, Dr. Maureen Hoff,at [place] for her to review.    Please fax to:  [phone]  Lucero Serrato    Phone Number Patient can be reached at: Cell number on file:    Telephone Information:   Mobile [phone]       Best Time: anytime    Can we leave a detailed message on this number? YES    Call taken on 9/19/2018 at 2:26 PM by Veronica Moore  .

## 2018-09-25 ENCOUNTER — OFFICE VISIT (OUTPATIENT)
Dept: FAMILY MEDICINE | Facility: CLINIC | Age: 58
End: 2018-09-25
Payer: COMMERCIAL

## 2018-09-25 VITALS
BODY MASS INDEX: 34.07 KG/M2 | DIASTOLIC BLOOD PRESSURE: 82 MMHG | TEMPERATURE: 98.3 F | OXYGEN SATURATION: 96 % | WEIGHT: 230 LBS | HEIGHT: 69 IN | SYSTOLIC BLOOD PRESSURE: 119 MMHG | HEART RATE: 77 BPM

## 2018-09-25 DIAGNOSIS — I48.21 PERMANENT ATRIAL FIBRILLATION (H): ICD-10-CM

## 2018-09-25 DIAGNOSIS — I49.3 PVC'S (PREMATURE VENTRICULAR CONTRACTIONS): ICD-10-CM

## 2018-09-25 DIAGNOSIS — R00.2 PALPITATIONS: Primary | ICD-10-CM

## 2018-09-25 PROCEDURE — 99213 OFFICE O/P EST LOW 20 MIN: CPT | Performed by: INTERNAL MEDICINE

## 2018-09-25 NOTE — PROGRESS NOTES
The patient is here for follow-up on his last office visit a week ago.  At that time the patient was having difficulty with feeling his heart beat faster and harder and pulsating in his neck and not feeling very well.  Multiple labs were done and an EKG was done as noted.  The labs are all normal and EKG showed his A. fib with PVCs.  He did have multiple PVCs on Holter in April.    At that office visit a week ago change the Norvasc to nighttime dosing and added Toprol 50 mg.  Since then his symptoms have resolved.  He is no longer having the palpitations or heavy beats.  No chest pain or shortness of breath or dizziness and he feels better overall.    Past Medical History:   Diagnosis Date     Aortic root dilatation (H) 2012    echo done 9/12 4.2 cm, ascending aorta mildy dilated at 3.9.  Nl lv fxn, mild lvh, lae, mild ai, mild mr and tr     Ascending aortic aneurysm (H) 2012    echo done 9/12 mildy dilated at 4.2.     Atrial fibrillation (H) 04/2018    seen on holter, added toprol 9/18     Atrial flutter (H) 12/2015    seen on holter, 5 minutes, holter done due to prior heart surgery, also 2:1 heart block and first degree av block     Chronic cough     for years     Cough      Diastolic dysfunction 12/15    on echo     GERD (gastroesophageal reflux disease)      Heart block 12/15    seen on holter, 2:1 and first degree     HTN (hypertension) 2007     LVH (left ventricular hypertrophy) 2006    on echo done for elev bp, fu 2010 same, mod thickening mv, mild tr, trace to mild mr; fu 2015 same as 2013     Mild aortic regurgitation      Mild mitral regurgitation     fu 2015 no change     Nephrolithiasis 2007    calcium ox     Normal colonoscopy 2014     Personal history of surgery to heart and great vessels, presenting hazards to health 7th grade    hole in heart     Subclavian artery stenosis, left (H) 2006    found due to bp difference in arms     Past Surgical History:   Procedure Laterality Date     CARDIAC SURGERY   "7th grade     hole in heart     LAPAROSCOPIC CHOLECYSTECTOMY  2/8/15    done naomie gillian gallbladder     Social History     Social History     Marital status:      Spouse name: N/A     Number of children: 2     Years of education: N/A     Occupational History      [employer]     Social History Main Topics     Smoking status: Never Smoker     Smokeless tobacco: Never Used     Alcohol use No     Drug use: No     Sexual activity: Yes     Partners: Female     Other Topics Concern     Not on file     Social History Narrative     Current Outpatient Prescriptions   Medication Sig Dispense Refill     amLODIPine (NORVASC) 5 MG tablet Take 1 tablet (5 mg) by mouth daily 90 tablet 3     Apixaban (ELIQUIS PO)        ASPIRIN NOT PRESCRIBED (INTENTIONAL) Please choose reason not prescribed, below 0 each 0     fluticasone (FLONASE) 50 MCG/ACT spray Spray 1-2 sprays into both nostrils daily 48 g 3     metoprolol succinate (TOPROL-XL) 50 MG 24 hr tablet Take 1 tablet (50 mg) by mouth daily 90 tablet 3     ranitidine (ZANTAC) 150 MG tablet Take 1 tablet (150 mg) by mouth 2 times daily 180 tablet 3     Allergies   Allergen Reactions     No Known Drug Allergy      FAMILY HISTORY NOTED AND REVIEWED    REVIEW OF SYSTEMS: above    PHYSICAL EXAM    /82 (BP Location: Right arm, Patient Position: Chair, Cuff Size: Adult Large)  Pulse 77  Temp 98.3  F (36.8  C) (Oral)  Ht 5' 9\" (1.753 m)  Wt 230 lb (104.3 kg)  SpO2 96%  BMI 33.97 kg/m2    Patient appears non toxic  Lungs clear  cv reglar rate and rhythm, no murmer, rub or gallop, no jvp    ASSESSMENT:  1. Palp, due to pvc's, doubt afib  2. Afib, rate fine    PLAN:  No change in meds  Call if symptoms  Follow up complete physical exam 2/19    Shad Pineda M.D.        "

## 2018-09-25 NOTE — MR AVS SNAPSHOT
After Visit Summary   9/25/2018    Kelvin Zhou    MRN: [id number]           Patient Information     Date Of Birth          1960        Visit Information        Provider Department      9/25/2018 2:30 PM Shad Pineda MD [facility]        Today's Diagnoses     Palpitations    -  1    Permanent atrial fibrillation (H)        PVC's (premature ventricular contractions)           Follow-ups after your visit        Your next 10 appointments already scheduled     Oct 11, 2018  3:00 PM CDT   [facility]    [address] [facility] [id number] [phone]           Bring a current list of meds and any records pertaining to this visit. For Physicals, please bring immunization records and any forms needing to be filled out. Please arrive 10 minutes early to complete paperwork.              Who to contact     If you have questions or need follow up information about today's clinic visit or your schedule please contact [facility] directly at [phone].  Normal or non-critical lab and imaging results will be communicated to you by MyChart, letter or phone within 4 business days after the clinic has received the results. If you do not hear from us within 7 days, please contact the clinic through Continuenthart or phone. If you have a critical or abnormal lab result, we will notify you by phone as soon as possible.  Submit refill requests through LoveByte or call your pharmacy and they will forward the refill request to us. Please allow 3 business days for your refill to be completed.          Additional Information About Your Visit        MyChart Information     LoveByte gives you secure access to your electronic health record. If you see a primary care provider, you can also send messages to your care team and make appointments. If you have questions, please call your primary care clinic.  If  "you do not have a primary care provider, please call [phone] and they will assist you.        Care EveryWhere ID     This is your Care EveryWhere ID. This could be used by other organizations to access your [place] medical records  [id number]        Your Vitals Were     Pulse Temperature Height Pulse Oximetry BMI (Body Mass Index)       77 98.3  F (36.8  C) (Oral) 5' 9\" (1.753 m) 96% 33.97 kg/m2        Blood Pressure from Last 3 Encounters:   09/25/18 119/82   09/18/18 136/78   02/26/18 126/78    Weight from Last 3 Encounters:   09/25/18 230 lb (104.3 kg)   09/18/18 230 lb (104.3 kg)   02/26/18 220 lb (99.8 kg)              Today, you had the following     No orders found for display       Primary Care Provider Office Phone # Fax #    Shad Pineda [phone] [address] [address]        Equal Access to Services     [facility]: Hadii aad ku hadasho Soomaali, waaxda luqadaha, qaybta kaalmada adejacob, kimmy mercado . So [facility] [phone].    ATENCIÓN: Si habla español, tiene a segundo disposición servicios gratuitos de asistencia lingüística. Llame al [phone].    We comply with applicable federal civil rights laws and [place] laws. We do not discriminate on the basis of race, color, national origin, age, disability, sex, sexual orientation, or gender identity.            Thank you!     Thank you for choosing [facility]  for your care. Our goal is always to provide you with excellent care. Hearing back from our patients is one way we can continue to improve our services. Please take a few minutes to complete the written survey that you may receive in the mail after your visit with us. Thank you!             Your Updated Medication List - Protect others around you: Learn how to safely use, store and throw away your medicines at www.disposemymeds.org.          This list is accurate as of 9/25/18  2:53 PM.  Always use " your most recent med list.                   Brand Name Dispense Instructions for use Diagnosis    amLODIPine 5 MG tablet    NORVASC    90 tablet    Take 1 tablet (5 mg) by mouth daily    Benign essential hypertension       ASPIRIN NOT PRESCRIBED    INTENTIONAL    0 each    Please choose reason not prescribed, below    Permanent atrial fibrillation (H)       ELIQUIS PO       Palpitations       fluticasone 50 MCG/ACT spray    FLONASE    48 g    Spray 1-2 sprays into both nostrils daily    Cough       metoprolol succinate 50 MG 24 hr tablet    TOPROL-XL    90 tablet    Take 1 tablet (50 mg) by mouth daily    Permanent atrial fibrillation (H), Benign essential hypertension       ranitidine 150 MG tablet    ZANTAC    180 tablet    Take 1 tablet (150 mg) by mouth 2 times daily    Gastroesophageal reflux disease without esophagitis

## 2019-02-13 ENCOUNTER — DOCUMENTATION ONLY (OUTPATIENT)
Dept: FAMILY MEDICINE | Facility: CLINIC | Age: 59
End: 2019-02-13

## 2019-02-13 DIAGNOSIS — Z00.00 ROUTINE GENERAL MEDICAL EXAMINATION AT A HEALTH CARE FACILITY: Primary | ICD-10-CM

## 2019-02-18 DIAGNOSIS — I10 BENIGN ESSENTIAL HYPERTENSION: ICD-10-CM

## 2019-02-19 NOTE — TELEPHONE ENCOUNTER
"Last Written Prescription Date:  2/26/18  Last Fill Quantity: 90 tablet,  # refills: 3   Last office visit: 9/25/2018 with prescribing provider:  Edwin   Future Office Visit:   Next 5 appointments (look out 90 days)    Feb 28, 2019  3:00 PM CST  [facility] [address] [facility] [id number] [phone]         Requested Prescriptions   Pending Prescriptions Disp Refills     amLODIPine (NORVASC) 5 MG tablet [Pharmacy Med Name: amLODIPine Besylate Oral Tablet 5 MG] 90 tablet 2     Sig: TAKE 1 TABLET BY MOUTH DAILY    Calcium Channel Blockers Protocol  Passed - 2/18/2019  5:52 AM       Passed - Blood pressure under 140/90 in past 12 months    BP Readings from Last 3 Encounters:   09/25/18 119/82   09/18/18 136/78   02/26/18 126/78                Passed - Recent (12 mo) or future (30 days) visit within the authorizing provider's specialty    Patient had office visit in the last 12 months or has a visit in the next 30 days with authorizing provider or within the authorizing provider's specialty.  See \"Patient Info\" tab in inbasket, or \"Choose Columns\" in Meds & Orders section of the refill encounter.             Passed - Medication is active on med list       Passed - Patient is age 18 or older       Passed - Normal serum creatinine on file in past 12 months    Recent Labs   Lab Test 09/18/18  0900   CR 1.01               "

## 2019-02-20 RX ORDER — AMLODIPINE BESYLATE 5 MG/1
TABLET ORAL
Qty: 90 TABLET | Refills: 2 | Status: SHIPPED | OUTPATIENT
Start: 2019-02-20 | End: 2019-02-28

## 2019-02-26 DIAGNOSIS — Z00.00 ROUTINE GENERAL MEDICAL EXAMINATION AT A HEALTH CARE FACILITY: ICD-10-CM

## 2019-02-26 LAB
ERYTHROCYTE [DISTWIDTH] IN BLOOD BY AUTOMATED COUNT: 14.8 % (ref 10–15)
HCT VFR BLD AUTO: 49.7 % (ref 40–53)
HGB BLD-MCNC: 16.6 G/DL (ref 13.3–17.7)
MCH RBC QN AUTO: 28.9 PG (ref 26.5–33)
MCHC RBC AUTO-ENTMCNC: 33.4 G/DL (ref 31.5–36.5)
MCV RBC AUTO: 86 FL (ref 78–100)
PLATELET # BLD AUTO: 175 10E9/L (ref 150–450)
RBC # BLD AUTO: 5.75 10E12/L (ref 4.4–5.9)
WBC # BLD AUTO: 7 10E9/L (ref 4–11)

## 2019-02-26 PROCEDURE — 36415 COLL VENOUS BLD VENIPUNCTURE: CPT | Performed by: INTERNAL MEDICINE

## 2019-02-26 PROCEDURE — 80053 COMPREHEN METABOLIC PANEL: CPT | Performed by: INTERNAL MEDICINE

## 2019-02-26 PROCEDURE — 80061 LIPID PANEL: CPT | Performed by: INTERNAL MEDICINE

## 2019-02-26 PROCEDURE — 85027 COMPLETE CBC AUTOMATED: CPT | Performed by: INTERNAL MEDICINE

## 2019-02-27 LAB
ALBUMIN SERPL-MCNC: 4 G/DL (ref 3.4–5)
ALP SERPL-CCNC: 58 U/L (ref 40–150)
ALT SERPL W P-5'-P-CCNC: 41 U/L (ref 0–70)
ANION GAP SERPL CALCULATED.3IONS-SCNC: 5 MMOL/L (ref 3–14)
AST SERPL W P-5'-P-CCNC: 31 U/L (ref 0–45)
BILIRUB SERPL-MCNC: 0.6 MG/DL (ref 0.2–1.3)
BUN SERPL-MCNC: 21 MG/DL (ref 7–30)
CALCIUM SERPL-MCNC: 9.3 MG/DL (ref 8.5–10.1)
CHLORIDE SERPL-SCNC: 105 MMOL/L (ref 94–109)
CHOLEST SERPL-MCNC: 157 MG/DL
CO2 SERPL-SCNC: 28 MMOL/L (ref 20–32)
CREAT SERPL-MCNC: 1.14 MG/DL (ref 0.66–1.25)
GFR SERPL CREATININE-BSD FRML MDRD: 69 ML/MIN/{1.73_M2}
GLUCOSE SERPL-MCNC: 99 MG/DL (ref 70–99)
HDLC SERPL-MCNC: 70 MG/DL
LDLC SERPL CALC-MCNC: 77 MG/DL
NONHDLC SERPL-MCNC: 87 MG/DL
POTASSIUM SERPL-SCNC: 4.7 MMOL/L (ref 3.4–5.3)
PROT SERPL-MCNC: 7.1 G/DL (ref 6.8–8.8)
SODIUM SERPL-SCNC: 138 MMOL/L (ref 133–144)
TRIGL SERPL-MCNC: 50 MG/DL

## 2019-02-28 ENCOUNTER — OFFICE VISIT (OUTPATIENT)
Dept: FAMILY MEDICINE | Facility: CLINIC | Age: 59
End: 2019-02-28
Payer: COMMERCIAL

## 2019-02-28 ENCOUNTER — ANCILLARY PROCEDURE (OUTPATIENT)
Dept: GENERAL RADIOLOGY | Facility: CLINIC | Age: 59
End: 2019-02-28
Attending: INTERNAL MEDICINE
Payer: COMMERCIAL

## 2019-02-28 VITALS
DIASTOLIC BLOOD PRESSURE: 70 MMHG | SYSTOLIC BLOOD PRESSURE: 122 MMHG | TEMPERATURE: 97 F | HEIGHT: 69 IN | OXYGEN SATURATION: 98 % | HEART RATE: 79 BPM | WEIGHT: 230 LBS | BODY MASS INDEX: 34.07 KG/M2

## 2019-02-28 DIAGNOSIS — Z00.00 ROUTINE GENERAL MEDICAL EXAMINATION AT A HEALTH CARE FACILITY: Primary | ICD-10-CM

## 2019-02-28 DIAGNOSIS — H53.2 DIPLOPIA: ICD-10-CM

## 2019-02-28 DIAGNOSIS — R00.2 PALPITATIONS: ICD-10-CM

## 2019-02-28 DIAGNOSIS — R06.83 SNORING: ICD-10-CM

## 2019-02-28 DIAGNOSIS — I34.0 MILD MITRAL REGURGITATION: ICD-10-CM

## 2019-02-28 DIAGNOSIS — I71.21 ASCENDING AORTIC ANEURYSM (H): ICD-10-CM

## 2019-02-28 DIAGNOSIS — R05.3 CHRONIC COUGH: ICD-10-CM

## 2019-02-28 DIAGNOSIS — I77.1 SUBCLAVIAN ARTERY STENOSIS, LEFT (H): ICD-10-CM

## 2019-02-28 DIAGNOSIS — I10 BENIGN ESSENTIAL HYPERTENSION: ICD-10-CM

## 2019-02-28 DIAGNOSIS — I48.21 PERMANENT ATRIAL FIBRILLATION (H): ICD-10-CM

## 2019-02-28 DIAGNOSIS — I51.89 DIASTOLIC DYSFUNCTION: ICD-10-CM

## 2019-02-28 DIAGNOSIS — K21.9 GASTROESOPHAGEAL REFLUX DISEASE WITHOUT ESOPHAGITIS: ICD-10-CM

## 2019-02-28 DIAGNOSIS — I35.1 MILD AORTIC REGURGITATION: ICD-10-CM

## 2019-02-28 DIAGNOSIS — I77.810 AORTIC ROOT DILATATION (H): ICD-10-CM

## 2019-02-28 PROCEDURE — 71046 X-RAY EXAM CHEST 2 VIEWS: CPT

## 2019-02-28 PROCEDURE — 93000 ELECTROCARDIOGRAM COMPLETE: CPT | Performed by: INTERNAL MEDICINE

## 2019-02-28 PROCEDURE — 99212 OFFICE O/P EST SF 10 MIN: CPT | Mod: 25 | Performed by: INTERNAL MEDICINE

## 2019-02-28 PROCEDURE — 99396 PREV VISIT EST AGE 40-64: CPT | Performed by: INTERNAL MEDICINE

## 2019-02-28 RX ORDER — AMLODIPINE BESYLATE 5 MG/1
5 TABLET ORAL DAILY
Qty: 90 TABLET | Refills: 3 | Status: SHIPPED | OUTPATIENT
Start: 2019-02-28 | End: 2020-04-02

## 2019-02-28 RX ORDER — METOPROLOL SUCCINATE 50 MG/1
50 TABLET, EXTENDED RELEASE ORAL DAILY
Qty: 90 TABLET | Refills: 3 | Status: SHIPPED | OUTPATIENT
Start: 2019-02-28 | End: 2020-08-28

## 2019-02-28 ASSESSMENT — MIFFLIN-ST. JEOR: SCORE: 1843.65

## 2019-02-28 NOTE — PROGRESS NOTES
SUBJECTIVE:   CC: Kelvin Zhou is an 59 year old male who presents for preventive health visit.     Patient is here for a physical.  Overall he is doing well but he does have a few issues to go over.    He plans follow-up at the [facility] for his heart issues in early April.  She would like to have several tests done prior to that including a Holter as well as chest x-ray and EKG.    The patient does snore.  He is never had a sleep study but does agree to it.    The patient has had episodes of right vision difficulty.  3 times, the last a month ago, he has had double vision in the right eye that last minutes.  If he closes his left eye he sees 2 images on top of each other.  He does not have it when he closes his right eye.  These come and go.  Additionally, he has had about 3 where episodes his right eye vision gets foggy as if somebody is painting a light gray paint over.  This will come and go.  It does not occur on the left side.  He has no other neurologic symptoms.  He is not having chest pain or shortness of breath.  No palpitations or dizziness.  Review of systems is otherwise negative except for slight diarrhea with no bloody or black stools.    Healthy Habits:    Do you get at least three servings of calcium containing foods daily (dairy, green leafy vegetables, etc.)? yes    Amount of exercise or daily activities, outside of work: 2 day(s) per week    Problems taking medications regularly No    Medication side effects: No    Have you had an eye exam in the past two years? yes    Do you see a dentist twice per year? yes    Do you have sleep apnea, excessive snoring or daytime drowsiness?no          Today's PHQ-2 Score:   PHQ-2 ( 1999 [employer]) 9/25/2018 2/26/2018   Q1: Little interest or pleasure in doing things 0 0   Q2: Feeling down, depressed or hopeless 0 0   PHQ-2 Score 0 0   Q1: Little interest or pleasure in doing things - -   Q2: Feeling down, depressed or hopeless - -   PHQ-2 Score - -        Abuse: Current or Past(Physical, Sexual or Emotional)- No  Do you feel safe in your environment? Yes    Social History     Tobacco Use     Smoking status: Never Smoker     Smokeless tobacco: Never Used   Substance Use Topics     Alcohol use: No     Alcohol/week: 0.0 oz     If you drink alcohol do you typically have >3 drinks per day or >7 drinks per week? No                Past Medical History:      Past Medical History:   Diagnosis Date     Aortic root dilatation (H) 2012    echo done 9/12 4.2 cm, ascending aorta mildy dilated at 3.9.  Nl lv fxn, mild lvh, lae, mild ai, mild mr and tr, fu 4/18 and stable     Ascending aortic aneurysm (H) 2012    echo done 9/12 mildy dilated at 4.2.     Atrial fibrillation (H) 04/2018    seen on holter, added eliquis, then added toprol 9/18     Atrial flutter (H) 12/2015    seen on holter, 5 minutes, holter done due to prior heart surgery, also 2:1 heart block and first degree av block     Chronic cough     for years     Diastolic dysfunction 12/15    on echo     GERD (gastroesophageal reflux disease)      Heart block 12/15    seen on holter, 2:1 and first degree     HTN (hypertension) 2007     LVH (left ventricular hypertrophy) 2006    on echo done for elev bp, fu 2010 same, mod thickening mv, mild tr, trace to mild mr; fu 2015 same as 2013     Mild aortic regurgitation      Mild mitral regurgitation     fu 2015 no change     Nephrolithiasis 2007    calcium ox     Normal colonoscopy 2014     Personal history of surgery to heart and great vessels, presenting hazards to health 7th grade    hole in heart     Subclavian artery stenosis, left (H) 2006    found due to bp difference in arms             Past Surgical History:      Past Surgical History:   Procedure Laterality Date     CARDIAC SURGERY  7th grade     hole in heart     LAPAROSCOPIC CHOLECYSTECTOMY  2/8/15    done gillian akbar gallbladder             Social History:     Social History     Socioeconomic History     Marital  status:      Spouse name: Not on file     Number of children: 2     Years of education: Not on file     Highest education level: Not on file   Occupational History     Occupation:      Employer: [employer]   Social Needs     Financial resource strain: Not on file     Food insecurity:     Worry: Not on file     Inability: Not on file     Transportation needs:     Medical: Not on file     Non-medical: Not on file   Tobacco Use     Smoking status: Never Smoker     Smokeless tobacco: Never Used   Substance and Sexual Activity     Alcohol use: No     Alcohol/week: 0.0 oz     Drug use: No     Sexual activity: Yes     Partners: Female   Lifestyle     Physical activity:     Days per week: Not on file     Minutes per session: Not on file     Stress: Not on file   Relationships     Social connections:     Talks on phone: Not on file     Gets together: Not on file     Attends Alevism service: Not on file     Active member of club or organization: Not on file     Attends meetings of clubs or organizations: Not on file     Relationship status: Not on file     Intimate partner violence:     Fear of current or ex partner: Not on file     Emotionally abused: Not on file     Physically abused: Not on file     Forced sexual activity: Not on file   Other Topics Concern     Parent/sibling w/ CABG, MI or angioplasty before 65F 55M? Not Asked   Social History Narrative     Not on file             Family History:   reviewed         Allergies:     Allergies   Allergen Reactions     No Known Drug Allergy              Medications:     Current Outpatient Medications   Medication Sig Dispense Refill     amLODIPine (NORVASC) 5 MG tablet Take 1 tablet (5 mg) by mouth daily 90 tablet 3     apixaban ANTICOAGULANT (ELIQUIS) 5 MG tablet Take 1 tablet (5 mg) by mouth 2 times daily 180 tablet 3     fluticasone (FLONASE) 50 MCG/ACT spray Spray 1-2 sprays into both nostrils daily 48 g 3     metoprolol succinate ER (TOPROL-XL)  "50 MG 24 hr tablet Take 1 tablet (50 mg) by mouth daily 90 tablet 3     ranitidine (ZANTAC) 150 MG tablet Take 1 tablet (150 mg) by mouth 2 times daily 180 tablet 3     ASPIRIN NOT PRESCRIBED (INTENTIONAL) Please choose reason not prescribed, below (Patient not taking: Reported on 2/28/2019) 0 each 0               Review of Systems:   The 10 point Review of Systems is negative other than noted in the HPI           Physical Exam:   Blood pressure 122/70, pulse 79, temperature 97  F (36.1  C), temperature source Oral, height 1.753 m (5' 9\"), weight 104.3 kg (230 lb), SpO2 98 %.    Exam:  Constitutional: healthy appearing, alert and in no distress  Heent: Normocephalic. Head without obvious masses or lesions. PERRLDC, EOMI. Mouth exam within normal limits: tongue, mucous membranes, posterior pharynx all normal, no lesions or abnormalities seen.  Tm's and canals within normal limits bilaterally. Neck supple, no nuchal rigidity or masses. No supraclavicular, or cervical adenopathy. Thyroid symmetric, no masses.  Cardiovascular: Regular rate and rhythm, no murmer, rub or gallops.  JVP not elevated, no edema.  Carotids within normal limits bilaterally, no bruits.  Respiratory: Normal respiratory effort.  Lungs clear, normal flow, no wheezing or crackles.  Breasts: Normal bilaterally.  No masses or lesions.  Nipples within normal limites.  No axillary lesions or nodes.  Gastrointestinal: Normal active bowel sounds.   Soft, not tender, no masses, guarding or rebound.  No hepatosplenomegaly.   Genitourinary: Rectal mod benign prostatic hypertrophy, smooth  Musculoskeletal: extremities normal, no gross deformities noted.  Skin: no suspicious lesions or rashes   Neurologic: Mental status within normal limits.  Speech fluent.  No gross motor abnormalities and gait intact.  Psychiatric: mentation appears normal and affect normal.         Data:   Labs reviewed with patient, cxr to be done; ekg - afib, lad, otherwise unchanged and " within normal limits.        Assessment:   1. Normal complete physical exam  2. Vision changes, right eye, not clear as to cause, needs eval, doubt emboli from valve/sbe, doubt vasculitis, ?atypical migraine, ?tia  3. Prior heart surgery  4. Afib, rate fine and taking eliquis reg  5. Aortic root dil, follow up echo  6. Subclavian artery stenosis on left, blood pressure fine on right  7. asc aortic aneurysm, follow up echo  8. Gerd, controlled  9. diast dysfxn, follow up echo  10. vavle dz, follow up echo  11. Hypertension, controlled  12. Snoring, sleep eval         Plan:   Echo  Carotid us  Mri brain  holter  cxr  Eye exam asap  Call if more episodes  Exercise, diet and weight loss  shingrix at pharm  Up to date soham Pineda M.D.

## 2019-02-28 NOTE — PATIENT INSTRUCTIONS
See the eye doctor asap    Get the echocardiogram, mri of your brain, carotid ultrasound and holter test.  Someone should call you to do those.    If you have more episodes of the vision issue let me know    Try to get your weight down    I would recommend getting the new shingles shot called shingrix, but I would do it at your pharmacy as they can check with the insurance company to see if it is paid for.    Shad Pineda M.D.              Preventive Health Recommendations  Male Ages 50 - 64    Yearly exam:             See your health care provider every year in order to  o   Review health changes.   o   Discuss preventive care.    o   Review your medicines if your doctor has prescribed any.     Have a cholesterol test every 5 years, or more frequently if you are at risk for high cholesterol/heart disease.     Have a diabetes test (fasting glucose) every three years. If you are at risk for diabetes, you should have this test more often.     Have a colonoscopy at age 50, or have a yearly FIT test (stool test). These exams will check for colon cancer.      Talk with your health care provider about whether or not a prostate cancer screening test (PSA) is right for you.    You should be tested each year for STDs (sexually transmitted diseases), if you re at risk.     Shots: Get a flu shot each year. Get a tetanus shot every 10 years.     Nutrition:    Eat at least 5 servings of fruits and vegetables daily.     Eat whole-grain bread, whole-wheat pasta and brown rice instead of white grains and rice.     Get adequate Calcium and Vitamin D.     Lifestyle    Exercise for at least 150 minutes a week (30 minutes a day, 5 days a week). This will help you control your weight and prevent disease.     Limit alcohol to one drink per day.     No smoking.     Wear sunscreen to prevent skin cancer.     See your dentist every six months for an exam and cleaning.     See your eye doctor every 1 to 2 years.

## 2019-03-05 ENCOUNTER — HOSPITAL ENCOUNTER (OUTPATIENT)
Dept: MRI IMAGING | Facility: CLINIC | Age: 59
End: 2019-03-05
Attending: INTERNAL MEDICINE
Payer: COMMERCIAL

## 2019-03-05 ENCOUNTER — HOSPITAL ENCOUNTER (OUTPATIENT)
Dept: ULTRASOUND IMAGING | Facility: CLINIC | Age: 59
Discharge: HOME | End: 2019-03-05
Attending: INTERNAL MEDICINE | Admitting: INTERNAL MEDICINE
Payer: COMMERCIAL

## 2019-03-05 DIAGNOSIS — H53.2 DIPLOPIA: ICD-10-CM

## 2019-03-05 PROCEDURE — A9585 GADOBUTROL INJECTION: HCPCS | Performed by: INTERNAL MEDICINE

## 2019-03-05 PROCEDURE — 25500064 ZZH RX 255 OP 636: Performed by: INTERNAL MEDICINE

## 2019-03-05 PROCEDURE — 25000125 ZZHC RX 250: Performed by: INTERNAL MEDICINE

## 2019-03-05 PROCEDURE — 70553 MRI BRAIN STEM W/O & W/DYE: CPT

## 2019-03-05 PROCEDURE — 93880 EXTRACRANIAL BILAT STUDY: CPT

## 2019-03-05 RX ORDER — GADOBUTROL 604.72 MG/ML
10 INJECTION INTRAVENOUS ONCE
Status: COMPLETED | OUTPATIENT
Start: 2019-03-05 | End: 2019-03-05

## 2019-03-05 RX ADMIN — GADOBUTROL 10 ML: 604.72 INJECTION INTRAVENOUS at 17:30

## 2019-03-05 RX ADMIN — SODIUM CHLORIDE, PRESERVATIVE FREE 10 ML: 5 INJECTION INTRAVENOUS at 17:30

## 2019-03-06 NOTE — RESULT ENCOUNTER NOTE
More good news, your brain mri is normal, no problems.  Have you seen the eye doctor and neurologist yet?  Please let me know what they say.    Shad

## 2019-03-15 ENCOUNTER — HOSPITAL ENCOUNTER (OUTPATIENT)
Dept: CARDIOLOGY | Facility: CLINIC | Age: 59
Discharge: HOME | End: 2019-03-15
Attending: INTERNAL MEDICINE | Admitting: INTERNAL MEDICINE
Payer: COMMERCIAL

## 2019-03-15 ENCOUNTER — HOSPITAL ENCOUNTER (OUTPATIENT)
Dept: CARDIOLOGY | Facility: CLINIC | Age: 59
End: 2019-03-15
Attending: INTERNAL MEDICINE
Payer: COMMERCIAL

## 2019-03-15 DIAGNOSIS — I48.21 PERMANENT ATRIAL FIBRILLATION (H): ICD-10-CM

## 2019-03-15 PROCEDURE — 93225 XTRNL ECG REC<48 HRS REC: CPT

## 2019-03-15 PROCEDURE — 93306 TTE W/DOPPLER COMPLETE: CPT | Mod: 26 | Performed by: INTERNAL MEDICINE

## 2019-03-15 PROCEDURE — 93227 XTRNL ECG REC<48 HR R&I: CPT | Performed by: INTERNAL MEDICINE

## 2019-03-15 PROCEDURE — 40000264 ECHOCARDIOGRAM COMPLETE

## 2019-03-18 ENCOUNTER — MYC MEDICAL ADVICE (OUTPATIENT)
Dept: FAMILY MEDICINE | Facility: CLINIC | Age: 59
End: 2019-03-18

## 2019-03-18 NOTE — RESULT ENCOUNTER NOTE
MrFay Zhou,    Your echo study looks good and is unchanged compared with the prior one.  There are no blood clots that could cause any strokes.    Please be sure that your [place] cardiologist evaluates this as well.    Have you seen the ophthalmologist yet? Have you had any more vision problems?  Please send me a note back.    Shad

## 2019-03-19 NOTE — TELEPHONE ENCOUNTER
Nml thyroid. Recheck 3 mos. PCP,    Please see updates from patient via MyChart. Pt states the neurologist was going to send an order to you for the patient to see a dietician? Have you seen this/ are you willing to order this?    Thank you,  Jefe ELAINE RN

## 2019-03-21 NOTE — RESULT ENCOUNTER NOTE
I discussed with patient, no dizziness.  He will follow up with [facility] re this.    Shad Pineda M.D.

## 2019-03-21 NOTE — TELEPHONE ENCOUNTER
I called patient.  He has met with both ophtho and neuro and ophtho thought could be migraine, does not sound like neuro, Dr. Pavan Crooks, found anything.      Patient to discussed with [place] doctor when there, call me if problems before    Shad Pineda M.D.

## 2019-03-21 NOTE — TELEPHONE ENCOUNTER
Please see My Chart message below and advise as appropriate.  TRENT TempleN, RN  [employer] Triage

## 2019-04-02 ENCOUNTER — TRANSFERRED RECORDS (OUTPATIENT)
Dept: HEALTH INFORMATION MANAGEMENT | Facility: CLINIC | Age: 59
End: 2019-04-02

## 2019-10-02 ENCOUNTER — HEALTH MAINTENANCE LETTER (OUTPATIENT)
Age: 59
End: 2019-10-02

## 2019-12-17 ENCOUNTER — TRANSFERRED RECORDS (OUTPATIENT)
Dept: HEALTH INFORMATION MANAGEMENT | Facility: CLINIC | Age: 59
End: 2019-12-17

## 2020-01-20 ENCOUNTER — TELEPHONE (OUTPATIENT)
Dept: FAMILY MEDICINE | Facility: CLINIC | Age: 60
End: 2020-01-20

## 2020-01-20 DIAGNOSIS — K21.9 GASTROESOPHAGEAL REFLUX DISEASE WITHOUT ESOPHAGITIS: Primary | ICD-10-CM

## 2020-01-21 RX ORDER — FAMOTIDINE 20 MG/1
20 TABLET, FILM COATED ORAL 2 TIMES DAILY
Qty: 180 TABLET | Refills: 3 | Status: SHIPPED | OUTPATIENT
Start: 2020-01-21 | End: 2020-10-12

## 2020-01-21 NOTE — TELEPHONE ENCOUNTER
Fax received from pharmacy stating that Ranitidine has been recalled, pharmacy requesting RX for something else     Disp Refills Start End RICARDO   ranitidine (ZANTAC) 150 MG tablet 180 tablet 3 2/28/2019  No   Sig - Route: Take 1 tablet (150 mg) by mouth 2 times daily - Oral

## 2020-02-14 DIAGNOSIS — K21.9 GASTROESOPHAGEAL REFLUX DISEASE WITHOUT ESOPHAGITIS: ICD-10-CM

## 2020-02-14 DIAGNOSIS — R05.9 COUGH: ICD-10-CM

## 2020-02-14 NOTE — TELEPHONE ENCOUNTER
"Last Written Prescription Date:  2/27/18  Last Fill Quantity: 48 g,  # refills: 3   Last office visit: 2/28/2019 with prescribing provider:  Edwin   Future Office Visit:      Requested Prescriptions   Pending Prescriptions Disp Refills     fluticasone (FLONASE) 50 MCG/ACT nasal spray 48 g 3     Sig: Spray 1-2 sprays into both nostrils daily       Inhaled Steroids Protocol Passed - 2/14/2020  5:45 PM        Passed - Patient is age 12 or older        Passed - Recent (12 mo) or future (30 days) visit within the authorizing provider's specialty     Patient has had an office visit with the authorizing provider or a provider within the authorizing providers department within the previous 12 mos or has a future within next 30 days. See \"Patient Info\" tab in inbasket, or \"Choose Columns\" in Meds & Orders section of the refill encounter.              Passed - Medication is active on med list          "

## 2020-02-17 RX ORDER — FLUTICASONE PROPIONATE 50 MCG
1-2 SPRAY, SUSPENSION (ML) NASAL DAILY
Qty: 18 G | Refills: 0 | Status: SHIPPED | OUTPATIENT
Start: 2020-02-17 | End: 2020-06-26

## 2020-04-01 DIAGNOSIS — I10 BENIGN ESSENTIAL HYPERTENSION: ICD-10-CM

## 2020-04-02 RX ORDER — AMLODIPINE BESYLATE 5 MG/1
TABLET ORAL
Qty: 90 TABLET | Refills: 0 | Status: SHIPPED | OUTPATIENT
Start: 2020-04-02 | End: 2020-08-21

## 2020-04-02 NOTE — TELEPHONE ENCOUNTER
"Requested Prescriptions   Pending Prescriptions Disp Refills     amLODIPine (NORVASC) 5 MG tablet [Pharmacy Med Name: amLODIPine Besylate Oral Tablet 5 MG] 90 tablet 2     Sig: TAKE ONE TABLET BY MOUTH ONCE DAILY  Last Written Prescription Date:  2/28/19  Last Fill Quantity: 90 tab,  # refills: 3   Last office visit: 2/28/2019 with prescribing provider:  Edwin   Future Office Visit:         Calcium Channel Blockers Protocol  Failed - 4/1/2020  7:16 PM        Failed - Blood pressure under 140/90 in past 12 months     BP Readings from Last 3 Encounters:   02/28/19 122/70   09/25/18 119/82   09/18/18 136/78                 Failed - Recent (12 mo) or future (30 days) visit within the authorizing provider's specialty     Patient has had an office visit with the authorizing provider or a provider within the authorizing providers department within the previous 12 mos or has a future within next 30 days. See \"Patient Info\" tab in inbasket, or \"Choose Columns\" in Meds & Orders section of the refill encounter.              Failed - Normal serum creatinine on file in past 12 months     Recent Labs   Lab Test 02/26/19  0722   CR 1.14       Ok to refill medication if creatinine is low          Passed - Medication is active on med list        Passed - Patient is age 18 or older            "

## 2020-04-02 NOTE — TELEPHONE ENCOUNTER
Prescription approved per [facility] Refill Protocol.  Ashley SELLERS RN

## 2020-05-19 ENCOUNTER — TELEPHONE (OUTPATIENT)
Dept: FAMILY MEDICINE | Facility: CLINIC | Age: 60
End: 2020-05-19

## 2020-05-19 NOTE — TELEPHONE ENCOUNTER
Reason for call:  Patient reporting a symptom    Symptom or request:  Patient has a sore right foot,ankle. It is swollen and hard to walk on.  No obvious injuries, but has been doing physical as an  and also gardening.  I offered a video visit today but patient feels that would not help and he wants to be seen in person.     Duration (how long have symptoms been present): a week    Have you been treated for this before? No    Additional comments:     Phone Number patient can be reached at:  Cell number on file:    Telephone Information:   Mobile [phone]     Best Time:  any    Can we leave a detailed message on this number:  YES    Call taken on 5/19/2020 at 8:29 AM by Gabbie Bruce

## 2020-05-19 NOTE — TELEPHONE ENCOUNTER
"No available F2F appts today. Will offer urgent care today.  ---------------------  States more swollen in the evening after being up and about.  NO redness.   Feels better after resting, then throughout day seems to get aggravated.    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  Recently carried a heavy casket 200 yards through a cemetary with 5 others.   Using Acetaminophen with good relief.  No hx of gout.  Takes Amlodipine \"for a long time\", but this is unilateral swelling with discomfort, so not likely s/e of Amlodipine.    Offered appt. He prefers to be mindful of his \"position of ankles\" while working and gardening.  He will try higher top shoes, elastic ankle support, Acetaminophen on a regular basis for a few days.    Appt.if not resolved soon.  Tierra Walter RN on 5/19/2020 at 11:47 AM      "

## 2020-06-25 ENCOUNTER — TELEPHONE (OUTPATIENT)
Dept: FAMILY MEDICINE | Facility: CLINIC | Age: 60
End: 2020-06-25

## 2020-06-25 NOTE — TELEPHONE ENCOUNTER
ALTERNATE FOR FAMOTIDINE requested:    Drug is on backorder. Please send RX for alternate medication.

## 2020-07-20 ENCOUNTER — DOCUMENTATION ONLY (OUTPATIENT)
Dept: FAMILY MEDICINE | Facility: CLINIC | Age: 60
End: 2020-07-20

## 2020-07-20 DIAGNOSIS — I10 BENIGN ESSENTIAL HYPERTENSION: ICD-10-CM

## 2020-07-20 DIAGNOSIS — Z00.00 ROUTINE GENERAL MEDICAL EXAMINATION AT A HEALTH CARE FACILITY: Primary | ICD-10-CM

## 2020-07-20 DIAGNOSIS — I48.21 PERMANENT ATRIAL FIBRILLATION (H): ICD-10-CM

## 2020-08-21 DIAGNOSIS — I10 BENIGN ESSENTIAL HYPERTENSION: ICD-10-CM

## 2020-08-21 RX ORDER — AMLODIPINE BESYLATE 5 MG/1
TABLET ORAL
Qty: 30 TABLET | Refills: 0 | Status: SHIPPED | OUTPATIENT
Start: 2020-08-21 | End: 2020-08-28

## 2020-08-21 NOTE — TELEPHONE ENCOUNTER
Epoxy message sent advising due for appt     Routing refill request to provider for review/approval because:  Laurita given x1 and patient did not follow up, please advise    Sunita FOREMAN RN

## 2020-10-08 DIAGNOSIS — I48.21 PERMANENT ATRIAL FIBRILLATION (H): ICD-10-CM

## 2020-10-08 DIAGNOSIS — Z00.00 ROUTINE GENERAL MEDICAL EXAMINATION AT A HEALTH CARE FACILITY: ICD-10-CM

## 2020-10-08 DIAGNOSIS — I10 BENIGN ESSENTIAL HYPERTENSION: ICD-10-CM

## 2020-10-08 LAB
ERYTHROCYTE [DISTWIDTH] IN BLOOD BY AUTOMATED COUNT: 14.9 % (ref 10–15)
HCT VFR BLD AUTO: 50.1 % (ref 40–53)
HGB BLD-MCNC: 17.1 G/DL (ref 13.3–17.7)
MCH RBC QN AUTO: 29.1 PG (ref 26.5–33)
MCHC RBC AUTO-ENTMCNC: 34.1 G/DL (ref 31.5–36.5)
MCV RBC AUTO: 85 FL (ref 78–100)
PLATELET # BLD AUTO: 162 10E9/L (ref 150–450)
PSA SERPL-ACNC: 0.9 UG/L (ref 0–4)
RBC # BLD AUTO: 5.87 10E12/L (ref 4.4–5.9)
WBC # BLD AUTO: 5.8 10E9/L (ref 4–11)

## 2020-10-08 PROCEDURE — 85027 COMPLETE CBC AUTOMATED: CPT | Performed by: INTERNAL MEDICINE

## 2020-10-08 PROCEDURE — G0103 PSA SCREENING: HCPCS | Performed by: INTERNAL MEDICINE

## 2020-10-08 PROCEDURE — 80053 COMPREHEN METABOLIC PANEL: CPT | Performed by: INTERNAL MEDICINE

## 2020-10-08 PROCEDURE — 80061 LIPID PANEL: CPT | Performed by: INTERNAL MEDICINE

## 2020-10-09 LAB
ALBUMIN SERPL-MCNC: 3.7 G/DL (ref 3.4–5)
ALP SERPL-CCNC: 57 U/L (ref 40–150)
ALT SERPL W P-5'-P-CCNC: 43 U/L (ref 0–70)
ANION GAP SERPL CALCULATED.3IONS-SCNC: 9 MMOL/L (ref 3–14)
AST SERPL W P-5'-P-CCNC: 30 U/L (ref 0–45)
BILIRUB SERPL-MCNC: 0.6 MG/DL (ref 0.2–1.3)
BUN SERPL-MCNC: 20 MG/DL (ref 7–30)
CALCIUM SERPL-MCNC: 9.3 MG/DL (ref 8.5–10.1)
CHLORIDE SERPL-SCNC: 104 MMOL/L (ref 94–109)
CHOLEST SERPL-MCNC: 159 MG/DL
CO2 SERPL-SCNC: 24 MMOL/L (ref 20–32)
CREAT SERPL-MCNC: 1.08 MG/DL (ref 0.66–1.25)
GFR SERPL CREATININE-BSD FRML MDRD: 73 ML/MIN/{1.73_M2}
GLUCOSE SERPL-MCNC: 88 MG/DL (ref 70–99)
HDLC SERPL-MCNC: 69 MG/DL
LDLC SERPL CALC-MCNC: 80 MG/DL
NONHDLC SERPL-MCNC: 90 MG/DL
POTASSIUM SERPL-SCNC: 4.5 MMOL/L (ref 3.4–5.3)
PROT SERPL-MCNC: 7.3 G/DL (ref 6.8–8.8)
SODIUM SERPL-SCNC: 137 MMOL/L (ref 133–144)
TRIGL SERPL-MCNC: 51 MG/DL

## 2020-10-12 ENCOUNTER — OFFICE VISIT (OUTPATIENT)
Dept: FAMILY MEDICINE | Facility: CLINIC | Age: 60
End: 2020-10-12
Payer: COMMERCIAL

## 2020-10-12 VITALS
OXYGEN SATURATION: 95 % | HEART RATE: 73 BPM | WEIGHT: 231 LBS | BODY MASS INDEX: 35.01 KG/M2 | DIASTOLIC BLOOD PRESSURE: 78 MMHG | SYSTOLIC BLOOD PRESSURE: 122 MMHG | TEMPERATURE: 97.1 F | HEIGHT: 68 IN

## 2020-10-12 DIAGNOSIS — I77.1 SUBCLAVIAN ARTERY STENOSIS, LEFT (H): ICD-10-CM

## 2020-10-12 DIAGNOSIS — I48.21 PERMANENT ATRIAL FIBRILLATION (H): ICD-10-CM

## 2020-10-12 DIAGNOSIS — R05.9 COUGH: ICD-10-CM

## 2020-10-12 DIAGNOSIS — I45.9 HEART BLOCK: ICD-10-CM

## 2020-10-12 DIAGNOSIS — E66.01 MORBID OBESITY (H): ICD-10-CM

## 2020-10-12 DIAGNOSIS — R05.3 CHRONIC COUGH: ICD-10-CM

## 2020-10-12 DIAGNOSIS — I77.810 AORTIC ROOT DILATATION (H): ICD-10-CM

## 2020-10-12 DIAGNOSIS — Z00.00 ROUTINE GENERAL MEDICAL EXAMINATION AT A HEALTH CARE FACILITY: Primary | ICD-10-CM

## 2020-10-12 DIAGNOSIS — H53.2 DIPLOPIA: ICD-10-CM

## 2020-10-12 DIAGNOSIS — R06.83 SNORING: ICD-10-CM

## 2020-10-12 DIAGNOSIS — K21.9 GASTROESOPHAGEAL REFLUX DISEASE WITHOUT ESOPHAGITIS: ICD-10-CM

## 2020-10-12 DIAGNOSIS — I10 BENIGN ESSENTIAL HYPERTENSION: ICD-10-CM

## 2020-10-12 DIAGNOSIS — R00.2 PALPITATIONS: ICD-10-CM

## 2020-10-12 PROCEDURE — 99396 PREV VISIT EST AGE 40-64: CPT | Performed by: INTERNAL MEDICINE

## 2020-10-12 RX ORDER — FLUTICASONE PROPIONATE 50 MCG
1-2 SPRAY, SUSPENSION (ML) NASAL DAILY
Qty: 150 G | Refills: 3 | Status: SHIPPED | OUTPATIENT
Start: 2020-10-12 | End: 2021-11-12

## 2020-10-12 RX ORDER — AMLODIPINE BESYLATE 5 MG/1
5 TABLET ORAL DAILY
Qty: 90 TABLET | Refills: 3 | Status: SHIPPED | OUTPATIENT
Start: 2020-10-12 | End: 2021-11-12

## 2020-10-12 RX ORDER — METOPROLOL SUCCINATE 25 MG/1
25 TABLET, EXTENDED RELEASE ORAL DAILY
Qty: 90 TABLET | Refills: 3 | Status: SHIPPED | OUTPATIENT
Start: 2020-10-12 | End: 2021-11-12

## 2020-10-12 RX ORDER — FAMOTIDINE 20 MG/1
20 TABLET, FILM COATED ORAL 2 TIMES DAILY
Qty: 180 TABLET | Refills: 3 | Status: SHIPPED | OUTPATIENT
Start: 2020-10-12 | End: 2021-11-12

## 2020-10-12 ASSESSMENT — MIFFLIN-ST. JEOR: SCORE: 1827.31

## 2020-10-12 NOTE — PROGRESS NOTES
SUBJECTIVE:   CC: Kelvin Zhou is an 60 year old male who presents for preventative health visit.     The patient overall is doing quite well.  He is not working out regularly but busy at work.  His weight remains an issue.    As noted, last year he had double vision.  An MRI of his brain was unremarkable and his carotid ultrasound really did not show anything significant.  He saw neuro and ophthalmology and nothing was found.  He subsequently went to the [facility] cardiology and there was no explanation.  His echocardiogram is abnormal as noted but there were no real changes.  He does have the chronic A. fib and takes Eliquis for that.    He is doing very well.  Occasionally he will have double vision but not often.  He has a chronic cough.  His acid reflux is controlled.  No cardiovascular symptoms.  He does have snoring but is never done the sleep study that we have recommended.  He got a flu shot recently.    Patient has been advised of split billing requirements and indicates understanding: Yes  Healthy Habits:    Getting at least 3 servings of Calcium per day:  Yes    Bi-annual eye exam:  Yes    Dental care twice a year:  Yes    Sleep apnea or symptoms of sleep apnea:  None    Diet:  Regular (no restrictions)    Frequency of exercise:  None    Duration of exercise:  Less than 15 minutes    Taking medications regularly:  Yes    Barriers to taking medications:  None    Medication side effects:  None    PHQ-2 Total Score:    Additional concerns today:  No              Today's PHQ-2 Score:   PHQ-2 ( 1999 Pfizer) 10/12/2020   Q1: Little interest or pleasure in doing things 0   Q2: Feeling down, depressed or hopeless 0   PHQ-2 Score 0   Q1: Little interest or pleasure in doing things -   Q2: Feeling down, depressed or hopeless -   PHQ-2 Score -       Abuse: Current or Past(Physical, Sexual or Emotional)- No  Do you feel safe in your environment? Yes    Have you ever done Advance Care Planning? (For example,  a Health Directive, POLST, or a discussion with a medical provider or your loved ones about your wishes): Yes, patient states has an Advance Care Planning document and will bring a copy to the clinic.                Past Medical History:      Past Medical History:   Diagnosis Date     Aortic root dilatation (H) 2012    echo done 9/12 4.2 cm, ascending aorta mildy dilated at 3.9.  Nl lv fxn, mild lvh, lae, mild ai, mild mr and tr, fu 4/18 and stable     Ascending aortic aneurysm (H) 2012    echo done 9/12 mildy dilated at 4.2.     Atrial fibrillation (H) 04/2018    seen on holter, added eliquis, then added toprol 9/18     Atrial flutter (H) 12/2015    seen on holter, 5 minutes, holter done due to prior heart surgery, also 2:1 heart block and first degree av block     Chronic cough     for years     Diastolic dysfunction 12/2015    on echo     Diplopia 2019    mri brain nl, carotid us less then 50% bilat, seen by ophtho and neuro and no cause found     GERD (gastroesophageal reflux disease)      Heart block 12/2015    seen on holter, 2:1 and first degree     HTN (hypertension) 2007     LVH (left ventricular hypertrophy) 2006    on echo done for elev bp, fu 2010 same, mod thickening mv, mild tr, trace to mild mr; fu 2015 same as 2013     Mild aortic regurgitation      Mild mitral regurgitation     fu 2015 no change     Nephrolithiasis 2007    calcium ox     Normal colonoscopy 2014     Personal history of surgery to heart and great vessels, presenting hazards to health 7th grade    hole in heart     Subclavian artery stenosis, left (H) 2006    found due to bp difference in arms     Vision changes 02/2019    carotid us neg, mri brain nl, echo unchanged, no clots             Past Surgical History:      Past Surgical History:   Procedure Laterality Date     CARDIAC SURGERY  7th grade     hole in heart     LAPAROSCOPIC CHOLECYSTECTOMY  2/8/15    done gillian akbar gallbladder             Social History:     Social History      Socioeconomic History     Marital status:      Spouse name: Not on file     Number of children: 2     Years of education: Not on file     Highest education level: Not on file   Occupational History     Occupation:      Employer: [employer]   Social Needs     Financial resource strain: Not on file     Food insecurity     Worry: Not on file     Inability: Not on file     Transportation needs     Medical: Not on file     Non-medical: Not on file   Tobacco Use     Smoking status: Never Smoker     Smokeless tobacco: Never Used   Substance and Sexual Activity     Alcohol use: No     Alcohol/week: 0.0 standard drinks     Drug use: No     Sexual activity: Yes     Partners: Female   Lifestyle     Physical activity     Days per week: Not on file     Minutes per session: Not on file     Stress: Not on file   Relationships     Social connections     Talks on phone: Not on file     Gets together: Not on file     Attends Advent service: Not on file     Active member of club or organization: Not on file     Attends meetings of clubs or organizations: Not on file     Relationship status: Not on file     Intimate partner violence     Fear of current or ex partner: Not on file     Emotionally abused: Not on file     Physically abused: Not on file     Forced sexual activity: Not on file   Other Topics Concern     Parent/sibling w/ CABG, MI or angioplasty before 65F 55M? Not Asked   Social History Narrative     Not on file             Family History:   reviewed         Allergies:     Allergies   Allergen Reactions     No Known Drug Allergy              Medications:     Current Outpatient Medications   Medication Sig Dispense Refill     amLODIPine (NORVASC) 5 MG tablet Take 1 tablet (5 mg) by mouth daily 90 tablet 3     apixaban ANTICOAGULANT (ELIQUIS ANTICOAGULANT) 5 MG tablet Take 1 tablet (5 mg) by mouth 2 times daily 180 tablet 3     ASPIRIN NOT PRESCRIBED (INTENTIONAL) Please choose reason not  "prescribed, below 0 each 0     famotidine (PEPCID) 20 MG tablet Take 1 tablet (20 mg) by mouth 2 times daily 180 tablet 3     fluticasone (FLONASE) 50 MCG/ACT nasal spray Spray 1-2 sprays into both nostrils daily 150 g 3     metoprolol succinate ER (TOPROL-XL) 25 MG 24 hr tablet Take 1 tablet (25 mg) by mouth daily 90 tablet 3               Review of Systems:   The 10 point Review of Systems is negative other than noted in the HPI           Physical Exam:   Blood pressure 122/78, pulse 73, temperature 97.1  F (36.2  C), temperature source Tympanic, height 1.727 m (5' 8\"), weight 104.8 kg (231 lb), SpO2 95 %.    Exam:  Constitutional: healthy appearing, alert and in no distress  Heent: Normocephalic. Head without obvious masses or lesions. PERRLDC, EOMI. Mouth exam within normal limits: tongue, mucous membranes, posterior pharynx all normal, no lesions or abnormalities seen.  Tm's and canals within normal limits bilaterally. Neck supple, no nuchal rigidity or masses. No supraclavicular, or cervical adenopathy. Thyroid symmetric, no masses.  Cardiovascular: irregular rate and rhythm, no murmer, rub or gallops.  JVP not elevated, no edema.  Carotids within normal limits bilaterally, no bruits.  Respiratory: Normal respiratory effort.  Lungs clear, normal flow, no wheezing or crackles.  Breasts: Normal bilaterally.  No masses or lesions.  Nipples within normal limites.  No axillary lesions or nodes.  Gastrointestinal: Normal active bowel sounds.   Soft, not tender, no masses, guarding or rebound.  No hepatosplenomegaly.   Genitourinary: Rectal min bph  Musculoskeletal: extremities normal, no gross deformities noted.  Skin: no suspicious lesions or rashes   Neurologic: Mental status within normal limits.  Speech fluent.  No gross motor abnormalities and gait intact.  Psychiatric: mentation appears normal and affect normal.         Data:   Labs reviewed with patient         Assessment:   1. Normal complete physical " exam  2. Afib, chronic, on noac, hr fine  3. Diplopia, ?cause, stable  4. Aortic root dil, stable by follow up last year  5. Obesity, weight loss  6. Snoring, r/o cedric, referred  7. Subclavian stenosis on left  8. Chronic cough, no cause found  9. hcm         Plan:   shingrix at pharm  Exercise, diet and weight loss  Call if changes  Up to date colon  Sleep karina Pineda M.D.

## 2021-06-30 ENCOUNTER — TELEPHONE (OUTPATIENT)
Dept: FAMILY MEDICINE | Facility: CLINIC | Age: 61
End: 2021-06-30

## 2021-06-30 NOTE — TELEPHONE ENCOUNTER
"PCP: please advise, patient dx with Covid 19 on Monday. Asking for recommendation for cough suppressant that won't interfere with cardiac meds. Patient denies fever, SOB, chest pain. Patient has dry, non productive cough with occasional wheezing. Patient had fever yesterday (102.0), has been taking tylenol, denies fever today.     Sylvie (wife)  Dx covid 19 Monday  Current meds, wanted to go to pharmacy, get cough suppressant  Don't want to interfere with afib med,   Not sleeping because of cough,  No fever today  No shortness breath  No chest pain.     Cough: dry, no runny nose, no   I can feel some wheezing in my lungs  Was vaccinated Pfizer, initial dose in december  Vaccinated with Moderna- feb/march  Wife states they have been staying \"Separate everything, both wearing masks\" in the house.   Dx Monday, both been home.   102 yesterday,Taking tylenol, no fever today.   Weak, not moving around to much.  Couldn't sleep at all last night  Lost taste/smell.- advised to keep up with fluids.       Callback: [phone]- okay to leave detail VM.    Mercedes Ye RN  [facility]        "

## 2021-07-01 NOTE — TELEPHONE ENCOUNTER
Pt was called. Reports improvement of symptoms. Wife picked up OTC Rx for him. Pt agreed to call clinic if worsening symptoms.

## 2021-09-04 ENCOUNTER — HEALTH MAINTENANCE LETTER (OUTPATIENT)
Age: 61
End: 2021-09-04

## 2021-11-11 DIAGNOSIS — K21.9 GASTROESOPHAGEAL REFLUX DISEASE WITHOUT ESOPHAGITIS: ICD-10-CM

## 2021-11-11 DIAGNOSIS — R05.9 COUGH: ICD-10-CM

## 2021-11-11 DIAGNOSIS — I10 BENIGN ESSENTIAL HYPERTENSION: ICD-10-CM

## 2021-11-12 RX ORDER — FLUTICASONE PROPIONATE 50 MCG
1-2 SPRAY, SUSPENSION (ML) NASAL DAILY
Qty: 16 G | Refills: 0 | Status: SHIPPED | OUTPATIENT
Start: 2021-11-12 | End: 2022-01-11

## 2021-11-12 RX ORDER — AMLODIPINE BESYLATE 5 MG/1
TABLET ORAL
Qty: 90 TABLET | Refills: 0 | Status: SHIPPED | OUTPATIENT
Start: 2021-11-12 | End: 2022-01-07

## 2021-11-12 RX ORDER — METOPROLOL SUCCINATE 25 MG/1
TABLET, EXTENDED RELEASE ORAL
Qty: 90 TABLET | Refills: 0 | Status: SHIPPED | OUTPATIENT
Start: 2021-11-12 | End: 2022-01-07

## 2021-11-12 RX ORDER — FAMOTIDINE 20 MG/1
TABLET, FILM COATED ORAL
Qty: 180 TABLET | Refills: 0 | Status: SHIPPED | OUTPATIENT
Start: 2021-11-12 | End: 2022-01-11

## 2021-11-12 NOTE — TELEPHONE ENCOUNTER
Pt due for visit, last seen 10/2020  First notice.  Sent mc informing to schedule.  Pended all for 90 days with pharm note as well  Heather Harris, RN  [facility] RN Triage Team

## 2021-12-25 ENCOUNTER — HEALTH MAINTENANCE LETTER (OUTPATIENT)
Age: 61
End: 2021-12-25

## 2022-01-06 DIAGNOSIS — R00.2 PALPITATIONS: ICD-10-CM

## 2022-01-06 DIAGNOSIS — I10 BENIGN ESSENTIAL HYPERTENSION: ICD-10-CM

## 2022-01-07 ENCOUNTER — MYC MEDICAL ADVICE (OUTPATIENT)
Dept: FAMILY MEDICINE | Facility: CLINIC | Age: 62
End: 2022-01-07
Payer: COMMERCIAL

## 2022-01-07 RX ORDER — METOPROLOL SUCCINATE 25 MG/1
TABLET, EXTENDED RELEASE ORAL
Qty: 30 TABLET | Refills: 0 | Status: SHIPPED | OUTPATIENT
Start: 2022-01-07 | End: 2022-01-11

## 2022-01-07 RX ORDER — APIXABAN 5 MG/1
TABLET, FILM COATED ORAL
Qty: 60 TABLET | Refills: 0 | Status: SHIPPED | OUTPATIENT
Start: 2022-01-07 | End: 2022-01-13

## 2022-01-07 RX ORDER — AMLODIPINE BESYLATE 5 MG/1
TABLET ORAL
Qty: 30 TABLET | Refills: 0 | Status: SHIPPED | OUTPATIENT
Start: 2022-01-07 | End: 2022-01-11

## 2022-01-07 NOTE — LETTER
[address], [address]  [facility] [address]  Phone: [phone]        January 12, 2022      Kelvin Zhou                                                                                                                                [facility] [facility] [address]            Dear Mr. Zhou,    We are concerned about your health care.  We recently provided you with a medication refill.  Many medications require routine follow-up with your Doctor.      At this time we ask that: You schedule a routine office visit with your physician to follow your medciations.  You last saw Dr. Pineda Oct 2020    Your prescription: Has been refilled for 1 month so you may have time for the above noted follow-up.      Thank you,      [place] Team on behalf of Shad Pineda MD / [email or web address]

## 2022-01-07 NOTE — TELEPHONE ENCOUNTER
Last OV 10/12/2020     Sent [employer] message asking patient to schedule appointment     Also - Routing to [facility] to contact patient to inform due for appointment. Thank you.     Routing refill request to provider for review/approval because:  Laurita given x1 and patient did not follow up, please advise  Patient needs to be seen because it has been more than 1 year since last office visit.    Sunita FOREMAN, Triage RN  [facility]

## 2022-01-11 ENCOUNTER — MYC REFILL (OUTPATIENT)
Dept: FAMILY MEDICINE | Facility: CLINIC | Age: 62
End: 2022-01-11
Payer: COMMERCIAL

## 2022-01-11 DIAGNOSIS — I10 BENIGN ESSENTIAL HYPERTENSION: ICD-10-CM

## 2022-01-11 DIAGNOSIS — R05.9 COUGH: ICD-10-CM

## 2022-01-11 DIAGNOSIS — K21.9 GASTROESOPHAGEAL REFLUX DISEASE WITHOUT ESOPHAGITIS: ICD-10-CM

## 2022-01-11 DIAGNOSIS — R00.2 PALPITATIONS: ICD-10-CM

## 2022-01-11 NOTE — TELEPHONE ENCOUNTER
.Reason for Call:  Other call back    Detailed comments: prescription refills on several medications sent to [facility] in  [facility]    Phone Number Patient can be reached at: Cell number on file:    Telephone Information:   Mobile [phone]       Best Time: anytme    Can we leave a detailed message on this number? YES    Call taken on 1/11/2022 at 10:39 AM by Elli Tineo MA

## 2022-01-12 NOTE — TELEPHONE ENCOUNTER
Patient scheduled Annual Physical. He wasn't able to get in until 05/13/22. Could he get a refill to get him to his appointment?

## 2022-01-13 DIAGNOSIS — R00.2 PALPITATIONS: ICD-10-CM

## 2022-01-13 RX ORDER — FAMOTIDINE 20 MG/1
20 TABLET, FILM COATED ORAL 2 TIMES DAILY
Qty: 180 TABLET | Refills: 0 | Status: SHIPPED | OUTPATIENT
Start: 2022-01-13 | End: 2022-04-22

## 2022-01-13 RX ORDER — AMLODIPINE BESYLATE 5 MG/1
5 TABLET ORAL DAILY
Qty: 30 TABLET | Refills: 0 | Status: SHIPPED | OUTPATIENT
Start: 2022-01-13 | End: 2022-04-22

## 2022-01-13 RX ORDER — METOPROLOL SUCCINATE 25 MG/1
25 TABLET, EXTENDED RELEASE ORAL DAILY
Qty: 30 TABLET | Refills: 0 | Status: SHIPPED | OUTPATIENT
Start: 2022-01-13 | End: 2022-04-22

## 2022-01-13 RX ORDER — FLUTICASONE PROPIONATE 50 MCG
1-2 SPRAY, SUSPENSION (ML) NASAL DAILY
Qty: 16 G | Refills: 0 | Status: SHIPPED | OUTPATIENT
Start: 2022-01-13 | End: 2022-04-22

## 2022-01-13 NOTE — TELEPHONE ENCOUNTER
Patient scheduled Annual Physical. He wasn't able to get in until 05/13/22.  Asking for a refill until appointment.    Last appointment with Dr. Pineda 10/12/20 for routine.    BP Readings from Last 3 Encounters:   10/12/20 122/78   02/28/19 122/70   09/25/18 119/82     Creatinine   Date Value Ref Range Status   10/08/2020 1.08 0.66 - 1.25 mg/dL Final     Laurita refill given.   No further refills until appointment completed.Message sent to pharmacy.    Refilled per [facility] protocol.  Jonelle Batista RN

## 2022-01-13 NOTE — TELEPHONE ENCOUNTER
Reason for Call:  Other prescription    Detailed comments: Pt currently has a rx at the pharm, pt is wondering if provider can send in a new rx for a 90day supply instead. A 1 month costs him $50, a 3 mon for $100. Pt doesn't see provider till May.     Phone Number Patient can be reached at: Cell number on file:    Telephone Information:   Mobile [phone]       Best Time: anytime    Can we leave a detailed message on this number? YES    Call taken on 1/13/2022 at 2:38 PM by Annel Denton

## 2022-01-14 NOTE — TELEPHONE ENCOUNTER
Routing refill request to provider for review/approval because:  Labs not current:    Creatinine   Date Value Ref Range Status   10/08/2020 1.08 0.66 - 1.25 mg/dL Final      Lab Results   Component Value Date    WBC 5.8 10/08/2020     Lab Results   Component Value Date    RBC 5.87 10/08/2020     Lab Results   Component Value Date    HGB 17.1 10/08/2020     Lab Results   Component Value Date    HCT 50.1 10/08/2020     Lab Results   Component Value Date    MCV 85 10/08/2020     Lab Results   Component Value Date    MCH 29.1 10/08/2020     Lab Results   Component Value Date    MCHC 34.1 10/08/2020     Lab Results   Component Value Date    RDW 14.9 10/08/2020     Lab Results   Component Value Date     10/08/2020      Yulisa Scott RN

## 2022-01-17 ENCOUNTER — MYC MEDICAL ADVICE (OUTPATIENT)
Dept: FAMILY MEDICINE | Facility: CLINIC | Age: 62
End: 2022-01-17
Payer: COMMERCIAL

## 2022-01-17 DIAGNOSIS — R00.2 PALPITATIONS: ICD-10-CM

## 2022-01-17 NOTE — TELEPHONE ENCOUNTER
Appointments in Next Year    May 13, 2022  7:00 AM  (Arrive by 6:45 AM)  Adult Preventative Visit with Shad Pineda MD  [facility] ([facility] ) [phone]        Pt is scheduled

## 2022-02-11 ENCOUNTER — TELEPHONE (OUTPATIENT)
Dept: FAMILY MEDICINE | Facility: CLINIC | Age: 62
End: 2022-02-11
Payer: COMMERCIAL

## 2022-02-11 NOTE — TELEPHONE ENCOUNTER
Pt calling regarding his Eliquis rx. It appears this was sent last month 180 and 0 refills as this is more cost affective for the pt. However, [facility] was unable to see this. This RN called [facility] and pharmacy staff was able to find this and work on filling it. They state it will be ready for the pt about tomorrow afternoon. This RN called pt back and relayed the information, he stated his understanding.     Radha BRADFORD RN  [facility]

## 2022-04-20 DIAGNOSIS — I10 BENIGN ESSENTIAL HYPERTENSION: ICD-10-CM

## 2022-04-20 DIAGNOSIS — R05.9 COUGH: ICD-10-CM

## 2022-04-20 DIAGNOSIS — K21.9 GASTROESOPHAGEAL REFLUX DISEASE WITHOUT ESOPHAGITIS: ICD-10-CM

## 2022-04-22 RX ORDER — FAMOTIDINE 20 MG/1
TABLET, FILM COATED ORAL
Qty: 180 TABLET | Refills: 0 | Status: SHIPPED | OUTPATIENT
Start: 2022-04-22 | End: 2022-05-13

## 2022-04-22 RX ORDER — METOPROLOL SUCCINATE 25 MG/1
TABLET, EXTENDED RELEASE ORAL
Qty: 30 TABLET | Refills: 0 | Status: SHIPPED | OUTPATIENT
Start: 2022-04-22 | End: 2022-05-13

## 2022-04-22 RX ORDER — FLUTICASONE PROPIONATE 50 MCG
SPRAY, SUSPENSION (ML) NASAL
Qty: 16 G | Refills: 0 | Status: SHIPPED | OUTPATIENT
Start: 2022-04-22 | End: 2022-05-13

## 2022-04-22 RX ORDER — AMLODIPINE BESYLATE 5 MG/1
TABLET ORAL
Qty: 30 TABLET | Refills: 0 | Status: SHIPPED | OUTPATIENT
Start: 2022-04-22 | End: 2022-05-13

## 2022-04-22 NOTE — TELEPHONE ENCOUNTER
Medication is being filled for 1 time refill only due to:  Patient needs to be seen because it has been more than one year since last visit.  Next 5 appointments (look out 90 days)    May 13, 2022  7:00 AM  (Arrive by 6:45 AM)  Adult Preventative Visit with Shad Pineda MD  [facility] ([facility] ) [address], [address]  [facility] [address] [phone]        Patient has future appointment scheduled.  Awa TAPIA - Registered Nurse  [facility]  Acute and Diagnostic Services

## 2022-05-10 ENCOUNTER — LAB (OUTPATIENT)
Dept: LAB | Facility: CLINIC | Age: 62
End: 2022-05-10
Payer: COMMERCIAL

## 2022-05-10 DIAGNOSIS — Z00.00 ENCOUNTER FOR ANNUAL PHYSICAL EXAM: ICD-10-CM

## 2022-05-10 LAB
ALBUMIN SERPL-MCNC: 3.7 G/DL (ref 3.4–5)
ALP SERPL-CCNC: 59 U/L (ref 40–150)
ALT SERPL W P-5'-P-CCNC: 34 U/L (ref 0–70)
ANION GAP SERPL CALCULATED.3IONS-SCNC: 4 MMOL/L (ref 3–14)
AST SERPL W P-5'-P-CCNC: 25 U/L (ref 0–45)
BILIRUB SERPL-MCNC: 0.8 MG/DL (ref 0.2–1.3)
BUN SERPL-MCNC: 17 MG/DL (ref 7–30)
CALCIUM SERPL-MCNC: 9.1 MG/DL (ref 8.5–10.1)
CHLORIDE BLD-SCNC: 105 MMOL/L (ref 94–109)
CHOLEST SERPL-MCNC: 132 MG/DL
CO2 SERPL-SCNC: 28 MMOL/L (ref 20–32)
CREAT SERPL-MCNC: 1.09 MG/DL (ref 0.66–1.25)
ERYTHROCYTE [DISTWIDTH] IN BLOOD BY AUTOMATED COUNT: 14.6 % (ref 10–15)
FASTING STATUS PATIENT QL REPORTED: YES
GFR SERPL CREATININE-BSD FRML MDRD: 76 ML/MIN/1.73M2
GLUCOSE BLD-MCNC: 101 MG/DL (ref 70–99)
HCT VFR BLD AUTO: 49.2 % (ref 40–53)
HDLC SERPL-MCNC: 66 MG/DL
HGB BLD-MCNC: 16.4 G/DL (ref 13.3–17.7)
LDLC SERPL CALC-MCNC: 54 MG/DL
MCH RBC QN AUTO: 28.9 PG (ref 26.5–33)
MCHC RBC AUTO-ENTMCNC: 33.3 G/DL (ref 31.5–36.5)
MCV RBC AUTO: 87 FL (ref 78–100)
NONHDLC SERPL-MCNC: 66 MG/DL
PLATELET # BLD AUTO: 169 10E3/UL (ref 150–450)
POTASSIUM BLD-SCNC: 4.3 MMOL/L (ref 3.4–5.3)
PROT SERPL-MCNC: 7.2 G/DL (ref 6.8–8.8)
RBC # BLD AUTO: 5.67 10E6/UL (ref 4.4–5.9)
SODIUM SERPL-SCNC: 137 MMOL/L (ref 133–144)
TRIGL SERPL-MCNC: 61 MG/DL
WBC # BLD AUTO: 5.5 10E3/UL (ref 4–11)

## 2022-05-10 PROCEDURE — 85027 COMPLETE CBC AUTOMATED: CPT

## 2022-05-10 PROCEDURE — 80053 COMPREHEN METABOLIC PANEL: CPT

## 2022-05-10 PROCEDURE — 36415 COLL VENOUS BLD VENIPUNCTURE: CPT

## 2022-05-10 PROCEDURE — 80061 LIPID PANEL: CPT

## 2022-05-12 NOTE — PROGRESS NOTES
SUBJECTIVE:   CC: Kelvin Zhou is an 62 year old male who presents for preventative health visit.     The patient is doing quite well but not working out regularly and his weight remains an issue.  He is due for follow-up at the [facility] and also due for follow-up echo.  He has his chronic cough for years which has not changed.  They mention it at mail and seeing their specialist there.  He has had left knee discomfort for the last month or so.  It is better today.  Its not swollen.  He otherwise is doing well and feels quite well.  He still works at [employer].        Healthy Habits:     Getting at least 3 servings of Calcium per day:  Yes    Bi-annual eye exam:  Yes    Dental care twice a year:  Yes    Sleep apnea or symptoms of sleep apnea:  None    Diet:  Regular (no restrictions)    Frequency of exercise:  1 day/week    Duration of exercise:  Less than 15 minutes    Taking medications regularly:  Yes    Medication side effects:  None    PHQ-2 Total Score: 0    Additional concerns today:  No              Today's PHQ-2 Score:   PHQ-2 ( 1999 Pfizer) 10/12/2020   Q1: Little interest or pleasure in doing things 0   Q2: Feeling down, depressed or hopeless 0   PHQ-2 Score 0   PHQ-2 Total Score (12-17 Years)- Positive if 3 or more points; Administer PHQ-A if positive 0   Q1: Little interest or pleasure in doing things -   Q2: Feeling down, depressed or hopeless -   PHQ-2 Score -       Abuse: Current or Past(Physical, Sexual or Emotional)- No  Do you feel safe in your environment? Yes        Social History     Tobacco Use     Smoking status: Never Smoker     Smokeless tobacco: Never Used   Substance Use Topics     Alcohol use: No     Alcohol/week: 0.0 standard drinks     If you drink alcohol do you typically have >3 drinks per day or >7 drinks per week? No               Past Medical History:      Past Medical History:   Diagnosis Date     Aortic root dilatation (H) 2012    echo done 9/12 4.2 cm, ascending  aorta mildy dilated at 3.9.  Nl lv fxn, mild lvh, lae, mild ai, mild mr and tr, fu 4/18 and stable     Ascending aortic aneurysm (H) 2012    echo done 9/12 mildy dilated at 4.2.     Atrial fibrillation (H) 04/2018    seen on holter, added eliquis, then added toprol 9/18     Atrial flutter (H) 12/2015    seen on holter, 5 minutes, holter done due to prior heart surgery, also 2:1 heart block and first degree av block     Chronic cough     for years     Diastolic dysfunction 12/2015    on echo     Diplopia 2019    mri brain nl, carotid us less then 50% bilat, seen by ophtho and neuro and no cause found     GERD (gastroesophageal reflux disease)      Heart block 12/2015    seen on holter, 2:1 and first degree     HTN (hypertension) 2007     LVH (left ventricular hypertrophy) 2006    on echo done for elev bp, fu 2010 same, mod thickening mv, mild tr, trace to mild mr; fu 2015 same as 2013     Mild aortic regurgitation      Mild mitral regurgitation     fu 2015 no change     Nephrolithiasis 2007    calcium ox     Normal colonoscopy 2014     Personal history of surgery to heart and great vessels, presenting hazards to health 7th grade    hole in heart     Subclavian artery stenosis, left (H) 2006    found due to bp difference in arms     Vision changes 02/2019    carotid us neg, mri brain nl, echo unchanged, no clots             Past Surgical History:      Past Surgical History:   Procedure Laterality Date     CARDIAC SURGERY  7th grade     hole in heart     LAPAROSCOPIC CHOLECYSTECTOMY  2/8/15    done gillian akbar gallbladder             Social History:     Social History     Socioeconomic History     Marital status:      Spouse name: Not on file     Number of children: 2     Years of education: Not on file     Highest education level: Not on file   Occupational History     Occupation:      Employer: [employer]   Tobacco Use     Smoking status: Never Smoker     Smokeless tobacco: Never Used  "  Substance and Sexual Activity     Alcohol use: No     Alcohol/week: 0.0 standard drinks     Drug use: No     Sexual activity: Yes     Partners: Female   Other Topics Concern     Parent/sibling w/ CABG, MI or angioplasty before 65F 55M? Not Asked   Social History Narrative     Not on file     Social Determinants of Health     Financial Resource Strain: Not on file   Food Insecurity: Not on file   Transportation Needs: Not on file   Physical Activity: Not on file   Stress: Not on file   Social Connections: Not on file   Intimate Partner Violence: Not on file   Housing Stability: Not on file             Family History:   reviewed         Allergies:     Allergies   Allergen Reactions     No Known Drug Allergy              Medications:     Current Outpatient Medications   Medication Sig Dispense Refill     amLODIPine (NORVASC) 5 MG tablet Take 1 tablet (5 mg) by mouth daily 90 tablet 3     apixaban ANTICOAGULANT (ELIQUIS ANTICOAGULANT) 5 MG tablet Take 1 tablet (5 mg) by mouth 2 times daily 180 tablet 3     famotidine (PEPCID) 20 MG tablet Take 1 tablet (20 mg) by mouth 2 times daily 180 tablet 3     fluticasone (FLONASE) 50 MCG/ACT nasal spray USE 1 TO 2 SPRAYS INTO BOTH NOSTRILS ONCE DAILY. 48 g 3     metoprolol succinate ER (TOPROL XL) 25 MG 24 hr tablet Take 1 tablet (25 mg) by mouth daily 90 tablet 3     ASPIRIN NOT PRESCRIBED (INTENTIONAL) Please choose reason not prescribed, below (Patient not taking: Reported on 5/13/2022) 0 each 0               Review of Systems:   The 10 point Review of Systems is negative other than noted in the HPI           Physical Exam:   Blood pressure 128/86, pulse 71, temperature 97  F (36.1  C), temperature source Tympanic, resp. rate 16, height 1.753 m (5' 9\"), weight 102.1 kg (225 lb), SpO2 98 %.    Exam:  Constitutional: healthy appearing, alert and in no distress  Heent: Normocephalic. Head without obvious masses or lesions. PERRLDC, EOMI. Mouth exam within normal limits: tongue, " mucous membranes, posterior pharynx all normal, no lesions or abnormalities seen.  Tm's and canals within normal limits bilaterally. Neck supple, no nuchal rigidity or masses. No supraclavicular, or cervical adenopathy. Thyroid symmetric, no masses.  Cardiovascular: Regular rate and rhythm, no murmer, rub or gallops.  JVP not elevated, no edema.  Carotids within normal limits bilaterally, no bruits.  Respiratory: Normal respiratory effort.  Lungs clear, normal flow, no wheezing or crackles.  Breasts: Normal bilaterally.  No masses or lesions.  Nipples within normal limites.  No axillary lesions or nodes.  Gastrointestinal: Normal active bowel sounds.   Soft, not tender, no masses, guarding or rebound.  No hepatosplenomegaly.   Genitourinary: Rectal min bph  Musculoskeletal: extremities normal, no gross deformities noted.  His knee exam bilaterally is fairly unremarkable.  No joint deformity, pain or swelling.  Skin: no suspicious lesions or rashes   Neurologic: Mental status within normal limits.  Speech fluent.  No gross motor abnormalities and gait intact.  Psychiatric: mentation appears normal and affect normal.         Data:   Labs reviewed with patient         Assessment:   1. Normal complete physical exam  2. Obesity, stressed weight loss  3. Afib, on noac, rate fine  4. asc aortic aneurysm, subclavian steel, valve dz, aortic root dil.  Will get echo and he will follow up [facility]  5. Chronic cough, I rec follow up [facility] for this  6. Hypertension, controled  7. Gerd, no issues  8. Aflutter  9. hcm         Plan:   Follow up ortho if ongoing knee issues  Echo  Follow up [facility] cards and chronic cough clinic  I stressed exercise, diet and weight loss  shingrix at pharm  Td shot today  covid booster at work  Up to date colon exam        Shad Pineda M.D.

## 2022-05-13 ENCOUNTER — OFFICE VISIT (OUTPATIENT)
Dept: FAMILY MEDICINE | Facility: CLINIC | Age: 62
End: 2022-05-13
Payer: COMMERCIAL

## 2022-05-13 VITALS
SYSTOLIC BLOOD PRESSURE: 128 MMHG | RESPIRATION RATE: 16 BRPM | BODY MASS INDEX: 33.33 KG/M2 | OXYGEN SATURATION: 98 % | WEIGHT: 225 LBS | HEART RATE: 71 BPM | TEMPERATURE: 97 F | HEIGHT: 69 IN | DIASTOLIC BLOOD PRESSURE: 86 MMHG

## 2022-05-13 DIAGNOSIS — I10 BENIGN ESSENTIAL HYPERTENSION: ICD-10-CM

## 2022-05-13 DIAGNOSIS — Z00.00 ENCOUNTER FOR ANNUAL PHYSICAL EXAM: ICD-10-CM

## 2022-05-13 DIAGNOSIS — I71.21 ASCENDING AORTIC ANEURYSM (H): ICD-10-CM

## 2022-05-13 DIAGNOSIS — I77.810 AORTIC ROOT DILATATION (H): ICD-10-CM

## 2022-05-13 DIAGNOSIS — I48.92 ATRIAL FLUTTER, UNSPECIFIED TYPE (H): ICD-10-CM

## 2022-05-13 DIAGNOSIS — R05.9 COUGH: ICD-10-CM

## 2022-05-13 DIAGNOSIS — I48.21 PERMANENT ATRIAL FIBRILLATION (H): ICD-10-CM

## 2022-05-13 DIAGNOSIS — I34.0 MILD MITRAL REGURGITATION: ICD-10-CM

## 2022-05-13 DIAGNOSIS — I35.1 MILD AORTIC REGURGITATION: ICD-10-CM

## 2022-05-13 DIAGNOSIS — K21.9 GASTROESOPHAGEAL REFLUX DISEASE WITHOUT ESOPHAGITIS: ICD-10-CM

## 2022-05-13 DIAGNOSIS — R05.3 CHRONIC COUGH: ICD-10-CM

## 2022-05-13 DIAGNOSIS — I77.1 SUBCLAVIAN ARTERY STENOSIS, LEFT (H): ICD-10-CM

## 2022-05-13 DIAGNOSIS — R00.2 PALPITATIONS: ICD-10-CM

## 2022-05-13 DIAGNOSIS — Z23 NEED FOR VACCINATION: Primary | ICD-10-CM

## 2022-05-13 PROCEDURE — 99396 PREV VISIT EST AGE 40-64: CPT | Mod: 25 | Performed by: INTERNAL MEDICINE

## 2022-05-13 PROCEDURE — 90714 TD VACC NO PRESV 7 YRS+ IM: CPT | Performed by: INTERNAL MEDICINE

## 2022-05-13 PROCEDURE — 90471 IMMUNIZATION ADMIN: CPT | Performed by: INTERNAL MEDICINE

## 2022-05-13 RX ORDER — FLUTICASONE PROPIONATE 50 MCG
SPRAY, SUSPENSION (ML) NASAL
Qty: 48 G | Refills: 3 | Status: SHIPPED | OUTPATIENT
Start: 2022-05-13 | End: 2023-05-30

## 2022-05-13 RX ORDER — METOPROLOL SUCCINATE 25 MG/1
25 TABLET, EXTENDED RELEASE ORAL DAILY
Qty: 90 TABLET | Refills: 3 | Status: SHIPPED | OUTPATIENT
Start: 2022-05-13 | End: 2023-04-25

## 2022-05-13 RX ORDER — FAMOTIDINE 20 MG/1
20 TABLET, FILM COATED ORAL 2 TIMES DAILY
Qty: 180 TABLET | Refills: 3 | Status: SHIPPED | OUTPATIENT
Start: 2022-05-13 | End: 2023-05-30

## 2022-05-13 RX ORDER — AMLODIPINE BESYLATE 5 MG/1
5 TABLET ORAL DAILY
Qty: 90 TABLET | Refills: 3 | Status: SHIPPED | OUTPATIENT
Start: 2022-05-13 | End: 2023-04-25

## 2022-05-13 ASSESSMENT — ENCOUNTER SYMPTOMS
PALPITATIONS: 0
EYE PAIN: 0
DIZZINESS: 0
MYALGIAS: 0
NERVOUS/ANXIOUS: 0
PARESTHESIAS: 0
JOINT SWELLING: 0
ABDOMINAL PAIN: 0
WEAKNESS: 0
FREQUENCY: 0
HEMATOCHEZIA: 0
COUGH: 1
ARTHRALGIAS: 0
SORE THROAT: 0
DIARRHEA: 0
SHORTNESS OF BREATH: 0
NAUSEA: 0
CONSTIPATION: 0
CHILLS: 0
HEADACHES: 0
HEMATURIA: 0
DYSURIA: 0
FEVER: 0
HEARTBURN: 0

## 2022-05-13 ASSESSMENT — PAIN SCALES - GENERAL: PAINLEVEL: NO PAIN (0)

## 2022-05-13 NOTE — PATIENT INSTRUCTIONS
I would recommend getting the new shingles shot called shingrix, but I would do it at your pharmacy as they can check with the insurance company to see if it is paid for.    Shad Pineda M.D.

## 2022-05-13 NOTE — NURSING NOTE
"Kelvin Zhou is a 62 year old patient who comes in today for a Blood Pressure check.  Initial BP:  BP (!) 157/87 (BP Location: Right arm, Patient Position: Chair, Cuff Size: Adult Large)   Pulse 73   Temp 97  F (36.1  C) (Tympanic)   Resp 16   Ht 1.753 m (5' 9\")   Wt 102.1 kg (225 lb)   SpO2 98%   BMI 33.23 kg/m       73  Disposition: provider notified while patient in the clinic  Mckenna Espinal CMA      "

## 2022-05-13 NOTE — NURSING NOTE
Prior to immunization administration, verified patients identity using patient s name and date of birth. Please see Immunization Activity for additional information.     Screening Questionnaire for Adult Immunization    Are you sick today?   No   Do you have allergies to medications, food, a vaccine component or latex?   No   Have you ever had a serious reaction after receiving a vaccination?   No   Do you have a long-term health problem with heart, lung, kidney, or metabolic disease (e.g., diabetes), asthma, a blood disorder, no spleen, complement component deficiency, a cochlear implant, or a spinal fluid leak?  Are you on long-term aspirin therapy?   No   Do you have cancer, leukemia, HIV/AIDS, or any other immune system problem?   No   Do you have a parent, brother, or sister with an immune system problem?   No   In the past 3 months, have you taken medications that affect  your immune system, such as prednisone, other steroids, or anticancer drugs; drugs for the treatment of rheumatoid arthritis, Crohn s disease, or psoriasis; or have you had radiation treatments?   No   Have you had a seizure, or a brain or other nervous system problem?   No   During the past year, have you received a transfusion of blood or blood    products, or been given immune (gamma) globulin or antiviral drug?   No   For women: Are you pregnant or is there a chance you could become       pregnant during the next month?   No   Have you received any vaccinations in the past 4 weeks?   No     Immunization questionnaire answers were all negative.        Per orders of Dr. Pineda, injection of TD given by Mckenna Owen CMA. Patient instructed to remain in clinic for 15 minutes afterwards, and to report any adverse reaction to me immediately.       Screening performed by Mckenna Owen CMA on 5/13/2022 at 7:28 AM.

## 2022-06-20 ENCOUNTER — ANCILLARY PROCEDURE (OUTPATIENT)
Dept: CARDIOLOGY | Facility: CLINIC | Age: 62
End: 2022-06-20
Attending: INTERNAL MEDICINE
Payer: COMMERCIAL

## 2022-06-20 DIAGNOSIS — I71.21 ASCENDING AORTIC ANEURYSM (H): ICD-10-CM

## 2022-06-20 DIAGNOSIS — I77.810 AORTIC ROOT DILATATION (H): ICD-10-CM

## 2022-06-20 DIAGNOSIS — I35.1 MILD AORTIC REGURGITATION: ICD-10-CM

## 2022-06-20 DIAGNOSIS — I77.1 SUBCLAVIAN ARTERY STENOSIS, LEFT (H): ICD-10-CM

## 2022-06-20 LAB — LVEF ECHO: NORMAL

## 2022-06-20 PROCEDURE — 93306 TTE W/DOPPLER COMPLETE: CPT | Performed by: INTERNAL MEDICINE

## 2022-07-11 ENCOUNTER — TELEPHONE (OUTPATIENT)
Dept: FAMILY MEDICINE | Facility: CLINIC | Age: 62
End: 2022-07-11

## 2022-07-11 NOTE — TELEPHONE ENCOUNTER
Please call the patient as he has not responded to my MyChart question.  I want to make sure that he has a follow-up cardiology appointment at the [facility].  Please let me know if he does and when.    Thank you    Shad Pineda M.D.

## 2022-07-13 NOTE — TELEPHONE ENCOUNTER
Pt returned call.  He is working on scheduling his cardiology follow up with [facility] and will call with appt info when he has appt scheduled

## 2022-07-13 NOTE — TELEPHONE ENCOUNTER
LVM for patient to return our call back - please see message from Dr. Pineda.      Vicki SCHNEIDER MA on 7/13/2022 at 9:58 AM

## 2022-10-22 ENCOUNTER — HEALTH MAINTENANCE LETTER (OUTPATIENT)
Age: 62
End: 2022-10-22

## 2023-03-08 ENCOUNTER — NURSE TRIAGE (OUTPATIENT)
Dept: FAMILY MEDICINE | Facility: CLINIC | Age: 63
End: 2023-03-08
Payer: COMMERCIAL

## 2023-03-08 NOTE — TELEPHONE ENCOUNTER
Writer called patient to follow up on below.    Patient states his symptoms are mild and he feels comfortable with waiting for appointment tomorrow but will seek [facility] if anything worsening in the interim.    3/9/2023 8:30 AM (Arrive by 8:10 AM) Dasia Dave APRN CNP [facility]     No further questions or concerns at this time.    Jewels Kurtz RN  [facility]

## 2023-03-08 NOTE — TELEPHONE ENCOUNTER
"Pt reporting that he has had an upper respiratory infection for the past week and is having some mild wheezing. Pt dispod to office in 4 hours; no openings in office at this time and pt declines UC. Routing to provider; please review/advise. Would you like pt to be seen in UC today, work into office schedule this afternoon or is ov tomorrow approved?    Future Appointments 3/8/2023 - 9/4/2023      Date Visit Type Length Department Provider     3/9/2023  8:30 AM OFFICE VISIT 30 min [facility]/Dasia Castellanos, DANNY CNP    Location Instructions:     [facility] is in [address] of the [facility] at [address] This is just south of [facility] and the [address] exit off of [address]. Free parking is available; access the lot by turning east from [facility] onto [address]. Through the main entrance, the clinic is directly to the left.                   1. ONSET: \"When did the nasal discharge start?\"       none  2. AMOUNT: \"How much discharge is there?\"       none  3. COUGH: \"Do you have a cough?\" If yes, ask: \"Describe the color of your sputum\" (clear, white, yellow, green)      Infrequent, non productive   4. RESPIRATORY DISTRESS: \"Describe your breathing.\"       Raspy with cough,  Breathing is normal, also have a rattle when I breath.   5. FEVER: \"Do you have a fever?\" If Yes, ask: \"What is your temperature, how was it measured, and when did it start?\"      Denies, hasnt checked temp  6. SEVERITY: \"Overall, how bad are you feeling right now?\" (e.g., doesn't interfere with normal activities, staying home from school/work, staying in bed)       Feeling fairly good  7. OTHER SYMPTOMS: \"Do you have any other symptoms?\" (e.g., sore throat, earache, wheezing, vomiting)      Wheezing even without cough    Reason for Disposition    Breathing difficulty    [1] MILD difficulty breathing (e.g., minimal/no SOB at rest, SOB with walking, " pulse <100) AND [2] NEW-onset or WORSE than normal    Additional Information    Negative: SEVERE difficulty breathing (e.g., struggling for each breath, speaks in single words)    Negative: Very weak (can't stand)    Negative: Sounds like a life-threatening emergency to the triager    Negative: Difficulty breathing and not from stuffy nose (e.g., not relieved by cleaning out the nose)    Negative: Runny nose is caused by pollen or other allergies    Negative: Cough is main symptom    Negative: Sore throat is main symptom    Negative: Patient sounds very sick or weak to the triager    Negative: Fever > 103 F (39.4 C)    Negative: Fever > 101 F (38.3 C) and over 60 years of age    Negative: Fever > 100.0 F (37.8 C) and has diabetes mellitus or a weak immune system (e.g., HIV positive, cancer chemotherapy, organ transplant, splenectomy, chronic steroids)    Negative: Fever > 100.0 F (37.8 C) and bedridden (e.g., nursing home patient, stroke, chronic illness, recovering from surgery)    Negative: Fever present > 3 days (72 hours)    Negative: Fever returns after gone for over 24 hours and symptoms worse or not improved    Negative: Sinus pain (not just congestion) and fever    Negative: Earache    Negative: Patient wants to be seen    Negative: Using nasal washes and pain medicine > 24 hours and sinus pain (lower forehead, cheekbone, or eye) persists    Negative: Nasal discharge present > 10 days    Negative: Sinus congestion (pressure, fullness) present > 10 days    Negative: Fever > 103 F (39.4 C)    Negative: [1] Fever > 101 F (38.3 C) AND [2] age > 60 years    Negative: [1] Fever > 100.0 F (37.8 C) AND [2] bedridden (e.g., nursing home patient, CVA, chronic illness, recovering from surgery)    Negative: [1] Fever > 100.0 F (37.8 C) AND [2] diabetes mellitus or weak immune system (e.g., HIV positive, cancer chemo, splenectomy, organ transplant, chronic steroids)    Negative: [1] Periods where breathing stops and then  "resumes normally AND [2] bedridden (e.g., nursing home patient, CVA)    Negative: Pregnant or postpartum (from 0 to 6 weeks after delivery)    Negative: Patient sounds very sick or weak to the triager    Negative: [1] MODERATE difficulty breathing (e.g., speaks in phrases, SOB even at rest, pulse 100-120) AND [2] NEW-onset or WORSE than normal    Negative: Oxygen level (e.g., pulse oximetry) 90 percent or lower    Negative: Wheezing can be heard across the room    Negative: Drooling or spitting out saliva (because can't swallow)    Negative: History of prior \"blood clot\" in leg or lungs (i.e., deep vein thrombosis, pulmonary embolism)    Negative: History of inherited increased risk of blood clots (e.g., Factor 5 Leiden, Anti-thrombin 3, Protein C or Protein S deficiency, Prothrombin mutation)    Negative: Major surgery in the past month    Negative: Hip or leg fracture (broken bone) in past month (or had cast on leg or ankle in past month)    Negative: Illness requiring prolonged bedrest in past month (e.g., immobilization, long hospital stay)    Negative: Long-distance travel in past month (e.g., car, bus, train, plane; with trip lasting 6 or more hours)    Negative: Cancer treatment in past six months (or has cancer now)    Negative: Extra heart beats OR irregular heart beating  (i.e., \"palpitations\")    Negative: Chest pain    Negative: [1] Wheezing (high pitched whistling sound) AND [2] previous asthma attacks or use of asthma medicines    Negative: [1] Difficulty breathing AND [2] only present when coughing    Negative: [1] Difficulty breathing AND [2] only from stuffy or runny nose    Negative: [1] Difficulty breathing AND [2] within 14 days of COVID-19 Exposure    Negative: SEVERE difficulty breathing (e.g., struggling for each breath, speaks in single words)    Negative: [1] Breathing stopped AND [2] hasn't returned    Negative: Choking on something    Negative: Bluish (or gray) lips or face now    " Negative: Difficult to awaken or acting confused (e.g., disoriented, slurred speech)    Negative: Passed out (i.e., lost consciousness, collapsed and was not responding)    Negative: Wheezing started suddenly after medicine, an allergic food or bee sting    Negative: Stridor    Negative: Slow, shallow and weak breathing    Negative: Sounds like a life-threatening emergency to the triager    Protocols used: RESPIRATORY MULTIPLE SYMPTOMS - GUIDELINE [id number], BREATHING DIFFICULTY-A-AH, COMMON COLD-A-OH

## 2023-03-08 NOTE — TELEPHONE ENCOUNTER
If he is having new wheezing or shortness of breath ns office visit tomorrow.  If he is having signfiicant shortness of breath or chest pain then office visit today, urgent care.    Shad Pineda M.D.

## 2023-03-09 ENCOUNTER — ANCILLARY PROCEDURE (OUTPATIENT)
Dept: GENERAL RADIOLOGY | Facility: CLINIC | Age: 63
End: 2023-03-09
Attending: NURSE PRACTITIONER
Payer: COMMERCIAL

## 2023-03-09 ENCOUNTER — OFFICE VISIT (OUTPATIENT)
Dept: FAMILY MEDICINE | Facility: CLINIC | Age: 63
End: 2023-03-09
Payer: COMMERCIAL

## 2023-03-09 VITALS
DIASTOLIC BLOOD PRESSURE: 80 MMHG | RESPIRATION RATE: 16 BRPM | HEIGHT: 69 IN | BODY MASS INDEX: 33.64 KG/M2 | HEART RATE: 69 BPM | WEIGHT: 227.1 LBS | OXYGEN SATURATION: 94 % | TEMPERATURE: 97.1 F | SYSTOLIC BLOOD PRESSURE: 144 MMHG

## 2023-03-09 DIAGNOSIS — R06.83 SNORES: ICD-10-CM

## 2023-03-09 DIAGNOSIS — B34.9 VIRAL ILLNESS: ICD-10-CM

## 2023-03-09 DIAGNOSIS — B34.9 VIRAL ILLNESS: Primary | ICD-10-CM

## 2023-03-09 DIAGNOSIS — R05.9 COUGH, UNSPECIFIED TYPE: ICD-10-CM

## 2023-03-09 LAB
FLUAV RNA SPEC QL NAA+PROBE: NEGATIVE
FLUBV RNA RESP QL NAA+PROBE: NEGATIVE
RSV RNA SPEC NAA+PROBE: NEGATIVE
SARS-COV-2 RNA RESP QL NAA+PROBE: NEGATIVE

## 2023-03-09 PROCEDURE — 99213 OFFICE O/P EST LOW 20 MIN: CPT | Performed by: NURSE PRACTITIONER

## 2023-03-09 PROCEDURE — 87637 SARSCOV2&INF A&B&RSV AMP PRB: CPT | Performed by: NURSE PRACTITIONER

## 2023-03-09 PROCEDURE — 71046 X-RAY EXAM CHEST 2 VIEWS: CPT | Mod: TC | Performed by: RADIOLOGY

## 2023-03-09 RX ORDER — ALBUTEROL SULFATE 90 UG/1
2 AEROSOL, METERED RESPIRATORY (INHALATION) EVERY 6 HOURS PRN
Qty: 18 G | Refills: 1 | Status: SHIPPED | OUTPATIENT
Start: 2023-03-09 | End: 2023-05-30

## 2023-03-09 ASSESSMENT — PAIN SCALES - GENERAL: PAINLEVEL: NO PAIN (0)

## 2023-03-09 NOTE — PROGRESS NOTES
Assessment & Plan     (B34.9) Viral illness  (primary encounter diagnosis)  Comment: Suspect viral illness.  Chest x-ray is without acute findings, reviewed by me.  Radiology agrees with no acute findings.  We also tested for COVID/RSV/influenza.  If symptoms are worsening then he may need treatment with either steroids or antibiotic or may need work-up with a CT scan.  I did send him with an inhaler at this time for symptoms and he can use any over-the-counter cough meds as needed  Plan: XR Chest 2 Views, Symptomatic Influenza A/B,         RSV, & SARS-CoV2 PCR (COVID-19), CANCELED:         Asymptomatic COVID-19 Virus (Coronavirus) by         PCR, CANCELED: Influenza A & B Antigen - Clinic        Collect, CANCELED: RSV rapid antigen            (R05.9) Cough, unspecified type  Comment:   Plan: XR Chest 2 Views, Symptomatic Influenza A/B,         RSV, & SARS-CoV2 PCR (COVID-19), albuterol         (PROAIR HFA/PROVENTIL HFA/VENTOLIN HFA) 108 (90        Base) MCG/ACT inhaler            (R06.83) Snores  Comment: States he snores and has been recommended a sleep study in the past but is yet to do that.  I strongly encouraged him to do this considering the findings on his last echocardiogram.  Plan: Adult Sleep Eval & Management          Referral                DANNY Lee CNP  [facility] DONNA Warren is a 63 year old, presenting for the following health issues:  Breathing Problem      History of Present Illness       Reason for visit:  Raspy breathing  Symptom onset:  3-7 days ago  Symptoms include:  Raspy breathing, chills, loose stools, cough, slight headache  Symptom intensity:  Moderate  Symptom progression:  Improving  Had these symptoms before:  No  What makes it better:  Actaminophen, cold meds.He consumes 0 sweetened beverage(s) daily.He exercises with enough effort to increase his heart rate 9 or less minutes per day.  He exercises with enough effort to  "increase his heart rate 3 or less days per week.   He is taking medications regularly.       Raspy breathing mainly with sleeping   Symptoms as noted above   Sunday tested neg for covid at home       Review of Systems   Detailed as above         Objective    BP (!) 144/80 (BP Location: Right arm, Patient Position: Sitting, Cuff Size: Adult Regular)   Pulse 69   Temp 97.1  F (36.2  C) (Temporal)   Resp 16   Ht 1.753 m (5' 9\")   Wt 103 kg (227 lb 1.6 oz)   SpO2 94%   BMI 33.54 kg/m    There is no height or weight on file to calculate BMI.  Physical Exam  Constitutional:       Appearance: Normal appearance.   HENT:      Head: Normocephalic.      Right Ear: Tympanic membrane, ear canal and external ear normal.      Left Ear: Tympanic membrane, ear canal and external ear normal.      Nose: No mucosal edema.      Mouth/Throat:      Pharynx: No oropharyngeal exudate.   Eyes:      Conjunctiva/sclera: Conjunctivae normal.   Cardiovascular:      Rate and Rhythm: Normal rate and regular rhythm.      Heart sounds: Normal heart sounds. No murmur heard.  Pulmonary:      Effort: Pulmonary effort is normal.      Breath sounds: Rhonchi (bibasilar) present.      Comments: Occasional dry cough  Musculoskeletal:      Cervical back: Normal range of motion.   Lymphadenopathy:      Cervical: No cervical adenopathy.   Skin:     General: Skin is warm and dry.   Neurological:      Mental Status: He is alert.   Psychiatric:         Mood and Affect: Mood normal.                    "

## 2023-04-06 ENCOUNTER — MYC MEDICAL ADVICE (OUTPATIENT)
Dept: FAMILY MEDICINE | Facility: CLINIC | Age: 63
End: 2023-04-06
Payer: COMMERCIAL

## 2023-04-06 NOTE — TELEPHONE ENCOUNTER
Please get the patient in sooner using an 8 AM or virtual spot in person.  Please let him know.    Thank you    Shad Pineda M.D.

## 2023-04-25 DIAGNOSIS — R00.2 PALPITATIONS: ICD-10-CM

## 2023-04-25 DIAGNOSIS — I10 BENIGN ESSENTIAL HYPERTENSION: ICD-10-CM

## 2023-04-25 RX ORDER — METOPROLOL SUCCINATE 25 MG/1
TABLET, EXTENDED RELEASE ORAL
Qty: 90 TABLET | Refills: 0 | Status: SHIPPED | OUTPATIENT
Start: 2023-04-25 | End: 2023-05-30

## 2023-04-25 RX ORDER — APIXABAN 5 MG/1
TABLET, FILM COATED ORAL
Qty: 180 TABLET | Refills: 0 | Status: SHIPPED | OUTPATIENT
Start: 2023-04-25 | End: 2023-05-30

## 2023-04-25 RX ORDER — AMLODIPINE BESYLATE 5 MG/1
TABLET ORAL
Qty: 90 TABLET | Refills: 0 | Status: SHIPPED | OUTPATIENT
Start: 2023-04-25 | End: 2023-05-30

## 2023-05-22 ENCOUNTER — TRANSFERRED RECORDS (OUTPATIENT)
Dept: HEALTH INFORMATION MANAGEMENT | Facility: CLINIC | Age: 63
End: 2023-05-22
Payer: COMMERCIAL

## 2023-05-24 ASSESSMENT — ENCOUNTER SYMPTOMS
JOINT SWELLING: 0
HEMATURIA: 0
FEVER: 0
PARESTHESIAS: 0
HEMATOCHEZIA: 0
CONSTIPATION: 0
ABDOMINAL PAIN: 0
FREQUENCY: 0
EYE PAIN: 0
ARTHRALGIAS: 0
CHILLS: 0
WEAKNESS: 0
DIZZINESS: 0
NAUSEA: 0
MYALGIAS: 0
HEADACHES: 0
PALPITATIONS: 0
COUGH: 1
DYSURIA: 0
DIARRHEA: 0
HEARTBURN: 0
SHORTNESS OF BREATH: 0
SORE THROAT: 0
NERVOUS/ANXIOUS: 0

## 2023-05-30 ENCOUNTER — OFFICE VISIT (OUTPATIENT)
Dept: FAMILY MEDICINE | Facility: CLINIC | Age: 63
End: 2023-05-30
Payer: COMMERCIAL

## 2023-05-30 VITALS
DIASTOLIC BLOOD PRESSURE: 76 MMHG | WEIGHT: 228 LBS | HEIGHT: 69 IN | HEART RATE: 68 BPM | BODY MASS INDEX: 33.77 KG/M2 | RESPIRATION RATE: 16 BRPM | SYSTOLIC BLOOD PRESSURE: 134 MMHG | OXYGEN SATURATION: 97 % | TEMPERATURE: 97.7 F

## 2023-05-30 DIAGNOSIS — H02.401 PTOSIS OF RIGHT EYELID: ICD-10-CM

## 2023-05-30 DIAGNOSIS — I77.1 SUBCLAVIAN ARTERY STENOSIS, LEFT (H): ICD-10-CM

## 2023-05-30 DIAGNOSIS — I10 BENIGN ESSENTIAL HYPERTENSION: ICD-10-CM

## 2023-05-30 DIAGNOSIS — I77.810 AORTIC ROOT DILATATION (H): ICD-10-CM

## 2023-05-30 DIAGNOSIS — R06.83 SNORING: ICD-10-CM

## 2023-05-30 DIAGNOSIS — R05.3 CHRONIC COUGH: ICD-10-CM

## 2023-05-30 DIAGNOSIS — H53.2 DIPLOPIA: ICD-10-CM

## 2023-05-30 DIAGNOSIS — R00.2 PALPITATIONS: ICD-10-CM

## 2023-05-30 DIAGNOSIS — Z00.00 ENCOUNTER FOR ANNUAL PHYSICAL EXAM: Primary | ICD-10-CM

## 2023-05-30 DIAGNOSIS — I48.21 PERMANENT ATRIAL FIBRILLATION (H): ICD-10-CM

## 2023-05-30 DIAGNOSIS — I71.21 ANEURYSM OF ASCENDING AORTA WITHOUT RUPTURE (H): ICD-10-CM

## 2023-05-30 DIAGNOSIS — K21.9 GASTROESOPHAGEAL REFLUX DISEASE WITHOUT ESOPHAGITIS: ICD-10-CM

## 2023-05-30 PROBLEM — E66.01 MORBID OBESITY (H): Status: RESOLVED | Noted: 2020-10-12 | Resolved: 2023-05-30

## 2023-05-30 LAB
ALBUMIN SERPL BCG-MCNC: 4.5 G/DL (ref 3.5–5.2)
ALP SERPL-CCNC: 58 U/L (ref 40–129)
ALT SERPL W P-5'-P-CCNC: 25 U/L (ref 10–50)
ANION GAP SERPL CALCULATED.3IONS-SCNC: 11 MMOL/L (ref 7–15)
AST SERPL W P-5'-P-CCNC: 34 U/L (ref 10–50)
BASOPHILS # BLD AUTO: 0 10E3/UL (ref 0–0.2)
BASOPHILS NFR BLD AUTO: 1 %
BILIRUB SERPL-MCNC: 0.7 MG/DL
BUN SERPL-MCNC: 18 MG/DL (ref 8–23)
CALCIUM SERPL-MCNC: 9.6 MG/DL (ref 8.8–10.2)
CHLORIDE SERPL-SCNC: 103 MMOL/L (ref 98–107)
CHOLEST SERPL-MCNC: 164 MG/DL
CREAT SERPL-MCNC: 1.13 MG/DL (ref 0.67–1.17)
DEPRECATED HCO3 PLAS-SCNC: 24 MMOL/L (ref 22–29)
EOSINOPHIL # BLD AUTO: 0.1 10E3/UL (ref 0–0.7)
EOSINOPHIL NFR BLD AUTO: 2 %
ERYTHROCYTE [DISTWIDTH] IN BLOOD BY AUTOMATED COUNT: 14 % (ref 10–15)
GFR SERPL CREATININE-BSD FRML MDRD: 73 ML/MIN/1.73M2
GLUCOSE SERPL-MCNC: 106 MG/DL (ref 70–99)
HCT VFR BLD AUTO: 50 % (ref 40–53)
HDLC SERPL-MCNC: 72 MG/DL
HGB BLD-MCNC: 16.5 G/DL (ref 13.3–17.7)
IMM GRANULOCYTES # BLD: 0 10E3/UL
IMM GRANULOCYTES NFR BLD: 0 %
LDLC SERPL CALC-MCNC: 81 MG/DL
LYMPHOCYTES # BLD AUTO: 1.1 10E3/UL (ref 0.8–5.3)
LYMPHOCYTES NFR BLD AUTO: 19 %
MCH RBC QN AUTO: 28.3 PG (ref 26.5–33)
MCHC RBC AUTO-ENTMCNC: 33 G/DL (ref 31.5–36.5)
MCV RBC AUTO: 86 FL (ref 78–100)
MONOCYTES # BLD AUTO: 0.6 10E3/UL (ref 0–1.3)
MONOCYTES NFR BLD AUTO: 10 %
NEUTROPHILS # BLD AUTO: 4 10E3/UL (ref 1.6–8.3)
NEUTROPHILS NFR BLD AUTO: 68 %
NONHDLC SERPL-MCNC: 92 MG/DL
PLATELET # BLD AUTO: 168 10E3/UL (ref 150–450)
POTASSIUM SERPL-SCNC: 4.6 MMOL/L (ref 3.4–5.3)
PROT SERPL-MCNC: 7.1 G/DL (ref 6.4–8.3)
PSA SERPL DL<=0.01 NG/ML-MCNC: 0.89 NG/ML (ref 0–4.5)
RBC # BLD AUTO: 5.83 10E6/UL (ref 4.4–5.9)
SODIUM SERPL-SCNC: 138 MMOL/L (ref 136–145)
TRIGL SERPL-MCNC: 56 MG/DL
WBC # BLD AUTO: 5.9 10E3/UL (ref 4–11)

## 2023-05-30 PROCEDURE — 99396 PREV VISIT EST AGE 40-64: CPT | Performed by: INTERNAL MEDICINE

## 2023-05-30 PROCEDURE — 99000 SPECIMEN HANDLING OFFICE-LAB: CPT | Performed by: INTERNAL MEDICINE

## 2023-05-30 PROCEDURE — 83519 RIA NONANTIBODY: CPT | Mod: 90 | Performed by: INTERNAL MEDICINE

## 2023-05-30 PROCEDURE — 36415 COLL VENOUS BLD VENIPUNCTURE: CPT | Performed by: INTERNAL MEDICINE

## 2023-05-30 PROCEDURE — 80053 COMPREHEN METABOLIC PANEL: CPT | Performed by: INTERNAL MEDICINE

## 2023-05-30 PROCEDURE — 86255 FLUORESCENT ANTIBODY SCREEN: CPT | Mod: 90 | Performed by: INTERNAL MEDICINE

## 2023-05-30 PROCEDURE — G0103 PSA SCREENING: HCPCS | Performed by: INTERNAL MEDICINE

## 2023-05-30 PROCEDURE — 80061 LIPID PANEL: CPT | Performed by: INTERNAL MEDICINE

## 2023-05-30 PROCEDURE — 85025 COMPLETE CBC W/AUTO DIFF WBC: CPT | Performed by: INTERNAL MEDICINE

## 2023-05-30 RX ORDER — AMLODIPINE BESYLATE 5 MG/1
5 TABLET ORAL DAILY
Qty: 90 TABLET | Refills: 3 | Status: SHIPPED | OUTPATIENT
Start: 2023-05-30 | End: 2024-06-03

## 2023-05-30 RX ORDER — METOPROLOL SUCCINATE 25 MG/1
25 TABLET, EXTENDED RELEASE ORAL DAILY
Qty: 90 TABLET | Refills: 3 | Status: SHIPPED | OUTPATIENT
Start: 2023-05-30 | End: 2024-06-03

## 2023-05-30 RX ORDER — METOPROLOL SUCCINATE 25 MG/1
25 TABLET, EXTENDED RELEASE ORAL DAILY
Qty: 90 TABLET | Refills: 0 | Status: CANCELLED | OUTPATIENT
Start: 2023-05-30

## 2023-05-30 RX ORDER — FLUTICASONE PROPIONATE 50 MCG
SPRAY, SUSPENSION (ML) NASAL
Qty: 48 G | Refills: 3 | Status: SHIPPED | OUTPATIENT
Start: 2023-05-30

## 2023-05-30 RX ORDER — AMLODIPINE BESYLATE 5 MG/1
5 TABLET ORAL DAILY
Qty: 90 TABLET | Refills: 0 | Status: CANCELLED | OUTPATIENT
Start: 2023-05-30

## 2023-05-30 RX ORDER — FAMOTIDINE 20 MG/1
20 TABLET, FILM COATED ORAL 2 TIMES DAILY
Qty: 180 TABLET | Refills: 3 | Status: SHIPPED | OUTPATIENT
Start: 2023-05-30 | End: 2024-06-03

## 2023-05-30 ASSESSMENT — PAIN SCALES - GENERAL: PAINLEVEL: NO PAIN (0)

## 2023-05-30 NOTE — PROGRESS NOTES
SUBJECTIVE:   CC: Kelvin is an 63 year old who presents for preventative health visit.     The patient is doing quite well but not working out a lot and his weight is an issue.  His blood pressure is quite good.  He feels well.  He has a chronic cough for years without recent change.    Recently he was found to have ptosis of the right eyelid and some double vision.  He saw an eye doctor and they requested a myasthenia panel which I will do.  He otherwise has been feeling quite well with no complaints.  He does snore and wonders about sleep apnea.  He has follow-up with [facility] next month.  She still works.  He has 2 kids and 4 grandkids.         View : No data to display.                Healthy Habits:    Getting at least 3 servings of Calcium per day:  Yes    Bi-annual eye exam:  Yes    Dental care twice a year:  Yes    Sleep apnea or symptoms of sleep apnea:  Daytime drowsiness    Diet:  Regular (no restrictions)    Frequency of exercise:  2-3 days/week    Duration of exercise:  Less than 15 minutes    Taking medications regularly:  Yes    Medication side effects:  None    PHQ-2 Total Score:                 Past Medical History:      Past Medical History:   Diagnosis Date     Aortic root dilatation (H) 2012    echo done 9/12 4.2 cm, ascending aorta mildy dilated at 3.9.  Nl lv fxn, mild lvh, lae, mild ai, mild mr and tr, fu 4/18 and stable     Ascending aortic aneurysm (H) 2012    echo done 9/12 mildy dilated at 4.2.     Atrial fibrillation (H) 04/2018    seen on holter, added eliquis, then added toprol 9/18     Atrial flutter (H) 12/2015    seen on holter, 5 minutes, holter done due to prior heart surgery, also 2:1 heart block and first degree av block     Chronic cough     for years     Diastolic dysfunction 12/2015    on echo     Diplopia 2019    mri brain nl, carotid us less then 50% bilat, seen by ophtho and neuro and no cause found     GERD (gastroesophageal reflux disease)      Heart block 12/2015     seen on holter, 2:1 and first degree     HTN (hypertension) 2007     LVH (left ventricular hypertrophy) 2006    on echo done for elev bp, fu 2010 same, mod thickening mv, mild tr, trace to mild mr; fu 2015 same as 2013     Mild aortic regurgitation      Mild mitral regurgitation     fu 2015 no change     Nephrolithiasis 2007    calcium ox     Normal colonoscopy 2014     Personal history of surgery to heart and great vessels, presenting hazards to health 7th grade    hole in heart     Subclavian artery stenosis, left (H) 2006    found due to bp difference in arms     Vision changes 02/2019    carotid us neg, mri brain nl, echo unchanged, no clots             Past Surgical History:      Past Surgical History:   Procedure Laterality Date     CARDIAC SURGERY  7th grade     hole in heart     LAPAROSCOPIC CHOLECYSTECTOMY  2/8/15    done gillian akbar gallbladder             Social History:     Social History     Socioeconomic History     Marital status:      Spouse name: Not on file     Number of children: 2     Years of education: Not on file     Highest education level: Not on file   Occupational History     Occupation: [employer]     Employer: [employer]   Tobacco Use     Smoking status: Never     Smokeless tobacco: Never   Vaping Use     Vaping status: Not on file   Substance and Sexual Activity     Alcohol use: No     Alcohol/week: 0.0 standard drinks of alcohol     Drug use: No     Sexual activity: Yes     Partners: Female   Other Topics Concern     Parent/sibling w/ CABG, MI or angioplasty before 65F 55M? Not Asked   Social History Narrative     Not on file     Social Determinants of Health     Financial Resource Strain: Not on file   Food Insecurity: Not on file   Transportation Needs: Not on file   Physical Activity: Not on file   Stress: Not on file   Social Connections: Not on file   Intimate Partner Violence: Not on file   Housing Stability: Not on file             Family History:   reviewed       "   Allergies:     Allergies   Allergen Reactions     No Known Drug Allergy              Medications:     Current Outpatient Medications   Medication Sig Dispense Refill     amLODIPine (NORVASC) 5 MG tablet Take 1 tablet (5 mg) by mouth daily 90 tablet 3     apixaban ANTICOAGULANT (ELIQUIS ANTICOAGULANT) 5 MG tablet Take 1 tablet (5 mg) by mouth 2 times daily 180 tablet 3     famotidine (PEPCID) 20 MG tablet Take 1 tablet (20 mg) by mouth 2 times daily 180 tablet 3     fluticasone (FLONASE) 50 MCG/ACT nasal spray USE 1 TO 2 SPRAYS INTO BOTH NOSTRILS ONCE DAILY. 48 g 3     metoprolol succinate ER (TOPROL XL) 25 MG 24 hr tablet Take 1 tablet (25 mg) by mouth daily 90 tablet 3     ASPIRIN NOT PRESCRIBED (INTENTIONAL) Please choose reason not prescribed, below 0 each 0               Review of Systems:   The 10 point Review of Systems is negative other than noted in the HPI           Physical Exam:   Blood pressure 134/76, pulse 68, temperature 97.7  F (36.5  C), temperature source Temporal, resp. rate 16, height 1.753 m (5' 9\"), weight 103.4 kg (228 lb), SpO2 97 %.    Exam:  Constitutional: healthy appearing, alert and in no distress  Heent: Normocephalic. Head without obvious masses or lesions. PERRLDC, EOMI. Mouth exam within normal limits: tongue, mucous membranes, posterior pharynx all normal, no lesions or abnormalities seen.  Tm's and canals within normal limits bilaterally. Neck supple, no nuchal rigidity or masses. No supraclavicular, or cervical adenopathy. Thyroid symmetric, no masses.  Cardiovascular: irregular rate and rhythm, no murmer, rub or gallops.  JVP not elevated, no edema.  Carotids within normal limits bilaterally, no bruits.  Respiratory: Normal respiratory effort.  Lungs clear, normal flow, no wheezing or crackles.  Breasts: Normal bilaterally.  No masses or lesions.  Nipples within normal limites.  No axillary lesions or nodes.  Gastrointestinal: Normal active bowel sounds.   Soft, not tender, no " masses, guarding or rebound.  No hepatosplenomegaly.   Genitourinary: Rectal min bph  Musculoskeletal: extremities normal, no gross deformities noted.  Skin: no suspicious lesions or rashes   Neurologic: Mental status within normal limits.  Speech fluent.  No gross motor abnormalities and gait intact.  Psychiatric: mentation appears normal and affect normal.         Data:   Labs sent        Assessment:   1. Normal complete physical exam  2. Hypertension, controlled  3. Chronic cough for years  4. Aortic root dil, aneurysm, subclavian stenosis, afib/aflutter, valve dz, to follow up [facility] for all of this  5. Gerd, no issues  6. Palp, no issues  7. Health care maintenance  8. Diplopia, follow up labs, did have as noted 2019         Plan:   shingrix at pharm  Up to date colon  Exercise, diet, weight loss  Follow up [facility]  Letter with labs  Consider neuro eval re vision issues, doubt cns issue.      Shad Pineda M.D.

## 2023-06-01 LAB
ACHR BIND AB SER-SCNC: 0 NMOL/L
ACHR BLOCK AB/ACHR TOTAL SFR SER: 0 %
STRIA MUS IGG SER QL IF: NORMAL

## 2023-06-02 LAB — ACHR MOD AB/ACHR TOTAL SFR SER: 4 %

## 2023-06-23 ENCOUNTER — MYC MEDICAL ADVICE (OUTPATIENT)
Dept: FAMILY MEDICINE | Facility: CLINIC | Age: 63
End: 2023-06-23
Payer: COMMERCIAL

## 2023-06-23 NOTE — TELEPHONE ENCOUNTER
Abstracted first dose of the Shingrix vaccine done on 06/21/2023 at [facility].

## 2023-09-27 ENCOUNTER — ANCILLARY PROCEDURE (OUTPATIENT)
Dept: GENERAL RADIOLOGY | Facility: CLINIC | Age: 63
End: 2023-09-27
Attending: PHYSICIAN ASSISTANT
Payer: COMMERCIAL

## 2023-09-27 ENCOUNTER — OFFICE VISIT (OUTPATIENT)
Dept: FAMILY MEDICINE | Facility: CLINIC | Age: 63
End: 2023-09-27
Payer: COMMERCIAL

## 2023-09-27 VITALS
TEMPERATURE: 97.3 F | HEART RATE: 65 BPM | WEIGHT: 240.1 LBS | DIASTOLIC BLOOD PRESSURE: 82 MMHG | RESPIRATION RATE: 17 BRPM | HEIGHT: 69 IN | OXYGEN SATURATION: 97 % | BODY MASS INDEX: 35.56 KG/M2 | SYSTOLIC BLOOD PRESSURE: 144 MMHG

## 2023-09-27 DIAGNOSIS — M79.671 RIGHT FOOT PAIN: Primary | ICD-10-CM

## 2023-09-27 PROCEDURE — 73630 X-RAY EXAM OF FOOT: CPT | Mod: TC | Performed by: RADIOLOGY

## 2023-09-27 PROCEDURE — 99213 OFFICE O/P EST LOW 20 MIN: CPT | Performed by: PHYSICIAN ASSISTANT

## 2023-09-27 ASSESSMENT — PAIN SCALES - GENERAL: PAINLEVEL: MODERATE PAIN (5)

## 2023-09-27 NOTE — PATIENT INSTRUCTIONS
Take tylenol as needed for pain up to 1000mg three times daily, do not exceed 3000mg in 24 hour period    Ice and elevate frequently

## 2023-09-27 NOTE — RESULT ENCOUNTER NOTE
Hi Mr. Zhou,     Here are your recent x-ray results which are negative for fractures of the bone.  Please continue to ice/elevate and let me know if symptoms don't improve over the next 1-2 weeks.     Please let us know if you have any questions or concerns.    Regards,  Siomara Cohen PA-C

## 2023-09-27 NOTE — PROGRESS NOTES
"Assessment and Plan:     (M79.672) Right foot pain  (primary encounter diagnosis)  Comment: medial, just proximal to 1st MTP joint, no e/o gout or infection, no pain with ROM of joint, no erythema or swelling , pain started after walking on some uneven ground, no trauma  Plan: XR Foot Right G/E 3 Views  Ice, elevate  Tylenol prn  Await XR results       Siomara Cohen PA-C      Pete Warren is a 63 year old, presenting for the following health issues:  Toe Pain      History of Present Illness       Reason for visit:  Sore big toe on my right foot.  Symptom onset:  1-2 weeks ago  Symptoms include:  Hurts when I walk.  Symptom intensity:  Moderate  Symptom progression:  Improving  Had these symptoms before:  No  What makes it worse:  Walking  What makes it better:  Ibuprofen    He eats 0-1 servings of fruits and vegetables daily.He consumes 0 sweetened beverage(s) daily.He exercises with enough effort to increase his heart rate 20 to 29 minutes per day.  He exercises with enough effort to increase his heart rate 4 days per week.   He is taking medications regularly.     Mr. Zhou is here for right foot/toe pain  It started about two weeks ago  The pain started when he stepped on some uneven ground, he is not sure if that contributed to the pain  The pain medial/proximal MTP joint         Review of Systems   See above      Objective      BP (!) 144/82   Pulse 65   Temp 97.3  F (36.3  C) (Temporal)   Resp 17   Ht 1.753 m (5' 9\")   Wt 108.9 kg (240 lb 1.6 oz)   SpO2 97%   BMI 35.46 kg/m        Physical Exam     GENERAL: healthy, alert and no distress  RESP: lungs clear to auscultation - no rales, no rhonchi, no wheezes  CV: regular rates and rhythm, normal S1 S2, no S3 or S4 and no murmur, no click or rub   MS: extremities- no gross deformities noted, no edema  Right foot: no swelling, erythema or skin changes  Tenderness w/palpation of medial aspect of foot just proximal to first MTP joint, no " tenderness of big toe, no pain with ROM of 1st MTP joint

## 2023-10-09 ENCOUNTER — MYC MEDICAL ADVICE (OUTPATIENT)
Dept: FAMILY MEDICINE | Facility: CLINIC | Age: 63
End: 2023-10-09

## 2023-10-10 NOTE — TELEPHONE ENCOUNTER
Chart updated with unspecified flu vac using estimated date.    Added Zeeshangrix kenton JUNIOR MA

## 2024-04-22 ENCOUNTER — TELEPHONE (OUTPATIENT)
Dept: FAMILY MEDICINE | Facility: CLINIC | Age: 64
End: 2024-04-22
Payer: COMMERCIAL

## 2024-04-22 NOTE — TELEPHONE ENCOUNTER
Reason for Call:  Appointment Request    Patient requesting this type of appt:  Preventive     Requested provider: Shad Pineda    Reason patient unable to be scheduled: Not within requested timeframe    When does patient want to be seen/preferred time:  Morning after 5/30/24    Comments: None    Could we send this information to you in [facility] or would you prefer to receive a phone call?:   Patient would prefer a phone call   Okay to leave a detailed message?: Yes at Cell number on file:    Telephone Information:   Mobile [phone]       Call taken on 4/22/2024 at 5:20 PM by Jasvir Long

## 2024-05-30 SDOH — HEALTH STABILITY: PHYSICAL HEALTH: ON AVERAGE, HOW MANY MINUTES DO YOU ENGAGE IN EXERCISE AT THIS LEVEL?: 30 MIN

## 2024-05-30 SDOH — HEALTH STABILITY: PHYSICAL HEALTH: ON AVERAGE, HOW MANY DAYS PER WEEK DO YOU ENGAGE IN MODERATE TO STRENUOUS EXERCISE (LIKE A BRISK WALK)?: 4 DAYS

## 2024-05-30 ASSESSMENT — SOCIAL DETERMINANTS OF HEALTH (SDOH): HOW OFTEN DO YOU GET TOGETHER WITH FRIENDS OR RELATIVES?: TWICE A WEEK

## 2024-06-03 ENCOUNTER — OFFICE VISIT (OUTPATIENT)
Dept: FAMILY MEDICINE | Facility: CLINIC | Age: 64
End: 2024-06-03
Payer: COMMERCIAL

## 2024-06-03 VITALS
TEMPERATURE: 97.7 F | SYSTOLIC BLOOD PRESSURE: 157 MMHG | DIASTOLIC BLOOD PRESSURE: 74 MMHG | WEIGHT: 230 LBS | HEART RATE: 65 BPM | OXYGEN SATURATION: 97 % | BODY MASS INDEX: 34.07 KG/M2 | RESPIRATION RATE: 16 BRPM | HEIGHT: 69 IN

## 2024-06-03 DIAGNOSIS — I34.0 MILD MITRAL REGURGITATION: ICD-10-CM

## 2024-06-03 DIAGNOSIS — R06.83 SNORING: ICD-10-CM

## 2024-06-03 DIAGNOSIS — I10 BENIGN ESSENTIAL HYPERTENSION: ICD-10-CM

## 2024-06-03 DIAGNOSIS — R00.2 PALPITATIONS: ICD-10-CM

## 2024-06-03 DIAGNOSIS — I35.1 MILD AORTIC REGURGITATION: ICD-10-CM

## 2024-06-03 DIAGNOSIS — I77.810 AORTIC ROOT DILATATION (H): ICD-10-CM

## 2024-06-03 DIAGNOSIS — I48.91 ATRIAL FIBRILLATION, UNSPECIFIED TYPE (H): ICD-10-CM

## 2024-06-03 DIAGNOSIS — Z23 NEED FOR VACCINATION: ICD-10-CM

## 2024-06-03 DIAGNOSIS — I77.1 SUBCLAVIAN ARTERY STENOSIS, LEFT (H): ICD-10-CM

## 2024-06-03 DIAGNOSIS — R05.3 CHRONIC COUGH: ICD-10-CM

## 2024-06-03 DIAGNOSIS — Z00.00 ENCOUNTER FOR MEDICARE ANNUAL WELLNESS EXAM: Primary | ICD-10-CM

## 2024-06-03 DIAGNOSIS — I51.89 DIASTOLIC DYSFUNCTION: ICD-10-CM

## 2024-06-03 DIAGNOSIS — K21.9 GASTROESOPHAGEAL REFLUX DISEASE WITHOUT ESOPHAGITIS: ICD-10-CM

## 2024-06-03 DIAGNOSIS — I71.21 ANEURYSM OF ASCENDING AORTA WITHOUT RUPTURE (H): ICD-10-CM

## 2024-06-03 LAB
ALBUMIN SERPL BCG-MCNC: 4.1 G/DL (ref 3.5–5.2)
ALP SERPL-CCNC: 58 U/L (ref 40–150)
ALT SERPL W P-5'-P-CCNC: 18 U/L (ref 0–70)
ANION GAP SERPL CALCULATED.3IONS-SCNC: 11 MMOL/L (ref 7–15)
AST SERPL W P-5'-P-CCNC: 30 U/L (ref 0–45)
BILIRUB SERPL-MCNC: 0.6 MG/DL
BUN SERPL-MCNC: 14.3 MG/DL (ref 8–23)
CALCIUM SERPL-MCNC: 9.4 MG/DL (ref 8.8–10.2)
CHLORIDE SERPL-SCNC: 102 MMOL/L (ref 98–107)
CHOLEST SERPL-MCNC: 150 MG/DL
CREAT SERPL-MCNC: 1.14 MG/DL (ref 0.67–1.17)
DEPRECATED HCO3 PLAS-SCNC: 26 MMOL/L (ref 22–29)
EGFRCR SERPLBLD CKD-EPI 2021: 71 ML/MIN/1.73M2
ERYTHROCYTE [DISTWIDTH] IN BLOOD BY AUTOMATED COUNT: 14.5 % (ref 10–15)
FASTING STATUS PATIENT QL REPORTED: YES
FASTING STATUS PATIENT QL REPORTED: YES
GLUCOSE SERPL-MCNC: 98 MG/DL (ref 70–99)
HCT VFR BLD AUTO: 49.4 % (ref 40–53)
HDLC SERPL-MCNC: 64 MG/DL
HGB BLD-MCNC: 15.9 G/DL (ref 13.3–17.7)
LDLC SERPL CALC-MCNC: 72 MG/DL
MCH RBC QN AUTO: 27.6 PG (ref 26.5–33)
MCHC RBC AUTO-ENTMCNC: 32.2 G/DL (ref 31.5–36.5)
MCV RBC AUTO: 86 FL (ref 78–100)
NONHDLC SERPL-MCNC: 86 MG/DL
PLATELET # BLD AUTO: 170 10E3/UL (ref 150–450)
POTASSIUM SERPL-SCNC: 4.8 MMOL/L (ref 3.4–5.3)
PROT SERPL-MCNC: 6.7 G/DL (ref 6.4–8.3)
RBC # BLD AUTO: 5.76 10E6/UL (ref 4.4–5.9)
SODIUM SERPL-SCNC: 139 MMOL/L (ref 135–145)
TRIGL SERPL-MCNC: 70 MG/DL
WBC # BLD AUTO: 5.7 10E3/UL (ref 4–11)

## 2024-06-03 PROCEDURE — 85027 COMPLETE CBC AUTOMATED: CPT | Performed by: INTERNAL MEDICINE

## 2024-06-03 PROCEDURE — 99397 PER PM REEVAL EST PAT 65+ YR: CPT | Mod: 25 | Performed by: INTERNAL MEDICINE

## 2024-06-03 PROCEDURE — 36415 COLL VENOUS BLD VENIPUNCTURE: CPT | Performed by: INTERNAL MEDICINE

## 2024-06-03 PROCEDURE — 80061 LIPID PANEL: CPT | Performed by: INTERNAL MEDICINE

## 2024-06-03 PROCEDURE — 90677 PCV20 VACCINE IM: CPT | Performed by: INTERNAL MEDICINE

## 2024-06-03 PROCEDURE — 80053 COMPREHEN METABOLIC PANEL: CPT | Performed by: INTERNAL MEDICINE

## 2024-06-03 PROCEDURE — 90471 IMMUNIZATION ADMIN: CPT | Performed by: INTERNAL MEDICINE

## 2024-06-03 PROCEDURE — 99214 OFFICE O/P EST MOD 30 MIN: CPT | Mod: 25 | Performed by: INTERNAL MEDICINE

## 2024-06-03 RX ORDER — METOPROLOL SUCCINATE 25 MG/1
25 TABLET, EXTENDED RELEASE ORAL DAILY
Qty: 90 TABLET | Refills: 3 | Status: SHIPPED | OUTPATIENT
Start: 2024-06-03

## 2024-06-03 RX ORDER — FAMOTIDINE 20 MG/1
20 TABLET, FILM COATED ORAL 2 TIMES DAILY
Qty: 180 TABLET | Refills: 3 | Status: SHIPPED | OUTPATIENT
Start: 2024-06-03

## 2024-06-03 RX ORDER — HYDROCHLOROTHIAZIDE 12.5 MG/1
12.5 CAPSULE ORAL DAILY
Qty: 90 CAPSULE | Refills: 3 | Status: SHIPPED | OUTPATIENT
Start: 2024-06-03

## 2024-06-03 RX ORDER — AMLODIPINE BESYLATE 5 MG/1
5 TABLET ORAL DAILY
Qty: 90 TABLET | Refills: 3 | Status: SHIPPED | OUTPATIENT
Start: 2024-06-03

## 2024-06-03 NOTE — LETTER
Giulia 3, 2024      Kelvin Zhou  [address] [place] [address]        Dear ,    We are writing to inform you of your test results.    I am happy to report that your cbc or complete blood count is normal with no signs of anemia, leukemia or platelet abnormalities.  Your chemistry panel shows no signs of diabetes.  Your blood salts, kidney tests, liver tests, and proteins are all fine.     Your total cholesterol is 150 with the normal range being below 200.  Your HDL or good cholesterol is 64 with the normal range being above 40.  Your LDL or bad cholesterol is 72 with the normal range being below 130.  These numbers are super     Resulted Orders   CBC with platelets   Result Value Ref Range    WBC Count 5.7 4.0 - 11.0 10e3/uL    RBC Count 5.76 4.40 - 5.90 10e6/uL    Hemoglobin 15.9 13.3 - 17.7 g/dL    Hematocrit 49.4 40.0 - 53.0 %    MCV 86 78 - 100 fL    MCH 27.6 26.5 - 33.0 pg    MCHC 32.2 31.5 - 36.5 g/dL    RDW 14.5 10.0 - 15.0 %    Platelet Count 170 150 - 450 10e3/uL   Comprehensive metabolic panel   Result Value Ref Range    Sodium 139 135 - 145 mmol/L      Comment:      Reference intervals for this test were updated on 09/26/2023 to more accurately reflect our healthy population. There may be differences in the flagging of prior results with similar values performed with this method. Interpretation of those prior results can be made in the context of the updated reference intervals.     Potassium 4.8 3.4 - 5.3 mmol/L    Carbon Dioxide (CO2) 26 22 - 29 mmol/L    Anion Gap 11 7 - 15 mmol/L    Urea Nitrogen 14.3 8.0 - 23.0 mg/dL    Creatinine 1.14 0.67 - 1.17 mg/dL    GFR Estimate 71 >60 mL/min/1.73m2    Calcium 9.4 8.8 - 10.2 mg/dL    Chloride 102 98 - 107 mmol/L    Glucose 98 70 - 99 mg/dL    Alkaline Phosphatase 58 40 - 150 U/L    AST 30 0 - 45 U/L      Comment:      Reference intervals for this test were updated on 6/12/2023 to more accurately reflect our healthy population.  There may be differences in the flagging of prior results with similar values performed with this method. Interpretation of those prior results can be made in the context of the updated reference intervals.    ALT 18 0 - 70 U/L      Comment:      Reference intervals for this test were updated on 6/12/2023 to more accurately reflect our healthy population. There may be differences in the flagging of prior results with similar values performed with this method. Interpretation of those prior results can be made in the context of the updated reference intervals.      Protein Total 6.7 6.4 - 8.3 g/dL    Albumin 4.1 3.5 - 5.2 g/dL    Bilirubin Total 0.6 <=1.2 mg/dL    Patient Fasting > 8hrs? Yes    Lipid panel reflex to direct LDL Fasting   Result Value Ref Range    Cholesterol 150 <200 mg/dL    Triglycerides 70 <150 mg/dL    Direct Measure HDL 64 >=40 mg/dL    LDL Cholesterol Calculated 72 <=100 mg/dL    Non HDL Cholesterol 86 <130 mg/dL    Patient Fasting > 8hrs? Yes     Narrative    Cholesterol  Desirable:  <200 mg/dL    Triglycerides  Normal:  Less than 150 mg/dL  Borderline High:  150-199 mg/dL  High:  200-499 mg/dL  Very High:  Greater than or equal to 500 mg/dL    Direct Measure HDL  Female:  Greater than or equal to 50 mg/dL   Male:  Greater than or equal to 40 mg/dL    LDL Cholesterol  Desirable:  <100mg/dL  Above Desirable:  100-129 mg/dL   Borderline High:  130-159 mg/dL   High:  160-189 mg/dL   Very High:  >= 190 mg/dL    Non HDL Cholesterol  Desirable:  130 mg/dL  Above Desirable:  130-159 mg/dL  Borderline High:  160-189 mg/dL  High:  190-219 mg/dL  Very High:  Greater than or equal to 220 mg/dL       If you have any questions or concerns, please call the clinic at the number listed above.       Sincerely,      Shad Pineda MD

## 2024-06-03 NOTE — RESULT ENCOUNTER NOTE
It was a pleasure seeing you for your physical examination.  I wanted to get back to you with your test results.  I have enclosed a copy for your review.      I am happy to report that your cbc or complete blood count is normal with no signs of anemia, leukemia or platelet abnormalities.  Your chemistry panel shows no signs of diabetes.  Your blood salts, kidney tests, liver tests, and proteins are all fine.    Your total cholesterol is 150 with the normal range being below 200.  Your HDL or good cholesterol is 64 with the normal range being above 40.  Your LDL or bad cholesterol is 72 with the normal range being below 130.  These numbers are super    I am happy to bring you this excellent report.  Please let me know if you have questions.    Shad Pineda M.D.

## 2024-06-03 NOTE — PROGRESS NOTES
Preventive Care Visit  [facility] DONNA Pineda MD, Internal Medicine  Raffaele 3, 2024          Pete Warren is a 64 year old, presenting for the following:    The patient overall is doing well.  He still works.  He was seen at the [facility] as noted in 2023 and had an echo and I reviewed that note.  They mention blood pressure needs to be better.  They also discussed sleep apnea.  He does snore.  His aneurysms are stable.  He otherwise is doing well.  He has a chronic cough for years without change.  He has occasional acid reflux.  No other complaints.  He does work out fairly often.    Unfortunately his wife was just diagnosed with breast cancer.               Past Medical History:      Past Medical History:   Diagnosis Date    Aortic root dilatation (H24) 2012    echo done 9/12 4.2 cm, ascending aorta mildy dilated at 3.9.  Nl lv fxn, mild lvh, lae, mild ai, mild mr and tr, fu 4/18 and stable; in 2022 sinus of valsalava 4.4cm    Ascending aortic aneurysm (H24) 2012    echo done 9/12 mildy dilated at 4.2.    Atrial fibrillation (H) 04/2018    seen on holter, added eliquis, then added toprol 9/18    Atrial flutter (H) 12/2015    seen on holter, 5 minutes, holter done due to prior heart surgery, also 2:1 heart block and first degree av block    Chronic cough     for years    Diastolic dysfunction 12/2015    on echo    Diplopia 2019    mri brain nl, carotid us less then 50% bilat, seen by ophtho and neuro and no cause found    GERD (gastroesophageal reflux disease)     Heart block 12/2015    seen on holter, 2:1 and first degree    HTN (hypertension) 2007    LVH (left ventricular hypertrophy) 2006    on echo done for elev bp, fu 2010 same, mod thickening mv, mild tr, trace to mild mr; fu 2015 same as 2013    Mild aortic regurgitation     Mild mitral regurgitation     fu 2015 no change    Nephrolithiasis 2007    calcium ox    Normal colonoscopy 2014    Personal history of surgery to heart and  great vessels, presenting hazards to health 7th grade    hole in heart    Subclavian artery stenosis, left (H24) 2006    found due to bp difference in arms    Vision changes 02/2019    carotid us neg, mri brain nl, echo unchanged, no clots             Past Surgical History:      Past Surgical History:   Procedure Laterality Date    CARDIAC SURGERY  7th grade     hole in heart    LAPAROSCOPIC CHOLECYSTECTOMY  2/8/15    done gillian akbar gallbladder             Social History:     Social History     Socioeconomic History    Marital status:      Spouse name: Not on file    Number of children: 2    Years of education: Not on file    Highest education level: Not on file   Occupational History    Occupation:      Employer: [employer]   Tobacco Use    Smoking status: Never    Smokeless tobacco: Never   Substance and Sexual Activity    Alcohol use: No     Alcohol/week: 0.0 standard drinks of alcohol    Drug use: No    Sexual activity: Yes     Partners: Female   Other Topics Concern    Parent/sibling w/ CABG, MI or angioplasty before 65F 55M? Not Asked   Social History Narrative    Not on file     Social Determinants of Health     Financial Resource Strain: Low Risk  (5/30/2024)    Financial Resource Strain     Within the past 12 months, have you or your family members you live with been unable to get utilities (heat, electricity) when it was really needed?: No   Food Insecurity: Low Risk  (5/30/2024)    Food Insecurity     Within the past 12 months, did you worry that your food would run out before you got money to buy more?: No     Within the past 12 months, did the food you bought just not last and you didn t have money to get more?: No   Transportation Needs: Low Risk  (5/30/2024)    Transportation Needs     Within the past 12 months, has lack of transportation kept you from medical appointments, getting your medicines, non-medical meetings or appointments, work, or from getting things that you  need?: No   Physical Activity: Insufficiently Active (5/30/2024)    Exercise Vital Sign     Days of Exercise per Week: 4 days     Minutes of Exercise per Session: 30 min   Stress: No Stress Concern Present (5/30/2024)    [employer] [place] [employer] - Occupational Stress Questionnaire     Feeling of Stress : Only a little   Social Connections: Unknown (5/30/2024)    Social Connection and Isolation Panel [NHANES]     Frequency of Communication with Friends and Family: Not on file     Frequency of Social Gatherings with Friends and Family: Twice a week     Attends Christianity Services: Not on file     Active Member of Clubs or Organizations: Not on file     Attends Club or Organization Meetings: Not on file     Marital Status: Not on file   Interpersonal Safety: Low Risk  (9/27/2023)    Interpersonal Safety     Do you feel physically and emotionally safe where you currently live?: Yes     Within the past 12 months, have you been hit, slapped, kicked or otherwise physically hurt by someone?: No     Within the past 12 months, have you been humiliated or emotionally abused in other ways by your partner or ex-partner?: No   Housing Stability: Low Risk  (5/30/2024)    Housing Stability     Do you have housing? : Yes     Are you worried about losing your housing?: No             Family History:   reviewed         Allergies:   No Known Allergies          Medications:     Current Outpatient Medications   Medication Sig Dispense Refill    amLODIPine (NORVASC) 5 MG tablet Take 1 tablet (5 mg) by mouth daily 90 tablet 3    apixaban ANTICOAGULANT (ELIQUIS ANTICOAGULANT) 5 MG tablet Take 1 tablet (5 mg) by mouth 2 times daily 180 tablet 3    ASPIRIN NOT PRESCRIBED (INTENTIONAL) Please choose reason not prescribed, below 0 each 0    famotidine (PEPCID) 20 MG tablet Take 1 tablet (20 mg) by mouth 2 times daily 180 tablet 3    fluticasone (FLONASE) 50 MCG/ACT nasal spray USE 1 TO 2 SPRAYS INTO BOTH NOSTRILS ONCE DAILY. 48 g  "3    hydrochlorothiazide (MICROZIDE) 12.5 MG capsule Take 1 capsule (12.5 mg) by mouth daily 90 capsule 3    metoprolol succinate ER (TOPROL XL) 25 MG 24 hr tablet Take 1 tablet (25 mg) by mouth daily 90 tablet 3               Review of Systems:     The 10 point Review of Systems is negative other than noted in the HPI           Physical Exam:   Blood pressure (!) 157/74, pulse 65, temperature 97.7  F (36.5  C), resp. rate 16, height 1.753 m (5' 9\"), weight 104.3 kg (230 lb), SpO2 97%.    Exam:  Constitutional: healthy appearing, alert and in no distress  Heent: Normocephalic. Head without obvious masses or lesions. PERRLDC, EOMI. Mouth exam within normal limits: tongue, mucous membranes, posterior pharynx all normal, no lesions or abnormalities seen.  Tm's and canals within normal limits bilaterally. Neck supple, no nuchal rigidity or masses. No supraclavicular, or cervical adenopathy. Thyroid symmetric, no masses.  Cardiovascular: irregular rate and rhythm, no murmer, rub or gallops.  JVP not elevated, no edema.  Carotids within normal limits bilaterally, no bruits.  Respiratory: Normal respiratory effort.  Lungs clear, normal flow, no wheezing or crackles.  Breasts: Normal bilaterally.  No masses or lesions.  Nipples within normal limites.  No axillary lesions or nodes.  Gastrointestinal: Normal active bowel sounds.   Soft, not tender, no masses, guarding or rebound.  No hepatosplenomegaly.   Genitourinary: Rectal not done  Musculoskeletal: extremities normal, no gross deformities noted.  Skin: no suspicious lesions or rashes   Neurologic: Mental status within normal limits.  Speech fluent.  No gross motor abnormalities and gait intact.  Psychiatric: mentation appears normal and affect normal.         Data:   Labs sent        Assessment:   Normal complete physical exam  Snoring, needs evaluation for sleep apnea and I will order it  Hypertension, control not adequate, I will add hydrochlorothiazide and monitor " it  A-fib, rate controlled and on NOAC  Thoracic aneurysms, stable, follow-up [facility]  Left subclavian artery stenosis, will check blood pressure on the right, when I did it was 147/72  Valve disease, follow-up [facility]  Acid reflux, controlled  Chronic cough, doubt malignant cause  Healthcare maintenance         Plan:   Prevnar 20  Other vaccines at pharmacy  He will get a routine colon exam  Add hydrochlorothiazide 12.5 mg daily and follow-up in 2 months  Sleep evaluation ordered  Exercise, diet and weight loss      Shad Pineda M.D.        Physical        Health Care Directive  Patient does not have a Health Care Directive or Living Will: Discussed advance care planning with patient; however, patient declined at this time.    HPI              5/30/2024   General Health   How would you rate your overall physical health? Good   Feel stress (tense, anxious, or unable to sleep) Only a little   (!) STRESS CONCERN      5/30/2024   Nutrition   Diet: Regular (no restrictions)         5/30/2024   Exercise   Days per week of moderate/strenous exercise 4 days   Average minutes spent exercising at this level 30 min         5/30/2024   Social Factors   Frequency of gathering with friends or relatives Twice a week   Worry food won't last until get money to buy more No   Food not last or not have enough money for food? No   Do you have housing?  Yes   Are you worried about losing your housing? No   Lack of transportation? No   Unable to get utilities (heat,electricity)? No         6/3/2024   Fall Risk   Fallen 2 or more times in the past year? No   Trouble with walking or balance? No          5/30/2024   Activities of Daily Living- Home Safety   Needs help with the following daily activites None of the above   Safety concerns in the home None of the above         5/30/2024   Dental   Dentist two times every year? Yes         5/30/2024   Hearing Screening   Hearing concerns? None of the above         5/30/2024   Driving  Risk Screening   Patient/family members have concerns about driving No         5/30/2024   General Alertness/Fatigue Screening   Have you been more tired than usual lately? No         5/30/2024   Urinary Incontinence Screening   Bothered by leaking urine in past 6 months No         5/30/2024   TB Screening   Were you born outside of the [place]? No         Today's PHQ-2 Score:       6/3/2024     9:25 AM   PHQ-2 ( 1999 Pf[employer])   Q1: Little interest or pleasure in doing things 0   Q2: Feeling down, depressed or hopeless 0   PHQ-2 Score 0   Q1: Little interest or pleasure in doing things Not at all   Q2: Feeling down, depressed or hopeless Not at all   PHQ-2 Score 0           5/30/2024   Substance Use   Alcohol more than 3/day or more than 7/wk Not Applicable   Do you have a current opioid prescription? No   How severe/bad is pain from 1 to 10? 0/10 (No Pain)   Do you use any other substances recreationally? No     Social History     Tobacco Use    Smoking status: Never    Smokeless tobacco: Never   Substance Use Topics    Alcohol use: No     Alcohol/week: 0.0 standard drinks of alcohol    Drug use: No           5/30/2024   AAA Screening   Family history of Abdominal Aortic Aneurysm (AAA)? No         5/30/2024   One time HIV Screening   Previous HIV test? No   Last PSA:   PSA   Date Value Ref Range Status   10/08/2020 0.90 0 - 4 ug/L Final     Comment:     Assay Method:  Chemiluminescence using Siemens Vista analyzer     Prostate Specific Antigen Screen   Date Value Ref Range Status   05/30/2023 0.89 0.00 - 4.50 ng/mL Final     ASCVD Risk   The 10-year ASCVD risk score (Manolo HASSAN, et al., 2019) is: 14.8%    Values used to calculate the score:      Age: 64 years      Sex: Male      Is Non- : No      Diabetic: No      Tobacco smoker: No      Systolic Blood Pressure: 157 mmHg      Is BP treated: Yes      HDL Cholesterol: 72 mg/dL      Total Cholesterol: 164 mg/dL            Reviewed and updated  "as needed this visit by Provider       Med Hx                Current providers sharing in care for this patient include:  Patient Care Team:  Shad Pineda MD as PCP - General (Internal Medicine)  Maureen Hoff MD as PCP - Internal Medicine (Internal Medicine)  Shad Pineda MD as Assigned PCP    The following health maintenance items are reviewed in Epic and correct as of today:  Health Maintenance   Topic Date Due    ANNUAL REVIEW OF HM ORDERS  Never done    HIV SCREENING  Never done    RSV VACCINE (Pregnancy & 60+) (1 - 1-dose 60+ series) Never done    COVID-19 Vaccine (3 - 2023-24 season) 09/01/2023    Pneumococcal Vaccine: 65+ Years (1 of 1 - PCV) 02/05/2024    COLORECTAL CANCER SCREENING  09/26/2024    MEDICARE ANNUAL WELLNESS VISIT  06/03/2025    FALL RISK ASSESSMENT  06/03/2025    ADVANCE CARE PLANNING  10/12/2025    GLUCOSE  05/30/2026    LIPID  05/30/2028    DTAP/TDAP/TD IMMUNIZATION (3 - Td or Tdap) 05/13/2032    HEPATITIS C SCREENING  Completed    PHQ-2 (once per calendar year)  Completed    INFLUENZA VACCINE  Completed    ZOSTER IMMUNIZATION  Completed    IPV IMMUNIZATION  Aged Out    HPV IMMUNIZATION  Aged Out    MENINGITIS IMMUNIZATION  Aged Out    RSV MONOCLONAL ANTIBODY  Aged Out            Objective    Exam  BP (!) 157/74 (BP Location: Right arm, Patient Position: Sitting, Cuff Size: Adult Regular)   Pulse 65   Temp 97.7  F (36.5  C)   Resp 16   Ht 1.753 m (5' 9\")   Wt 104.3 kg (230 lb)   SpO2 97%   BMI 33.97 kg/m     Estimated body mass index is 33.97 kg/m  as calculated from the following:    Height as of this encounter: 1.753 m (5' 9\").    Weight as of this encounter: 104.3 kg (230 lb).    Physical Exam           6/3/2024   Mini Cog   Clock Draw Score 2 Normal   3 Item Recall 2 objects recalled   Mini Cog Total Score 4              Signed Electronically by: Shad Pineda MD    "

## 2024-06-03 NOTE — PATIENT INSTRUCTIONS
"I would get a covid booster now at the pharmacy.    I would get the rsv vaccine in the fall.    Start the new blood pressure medicine called hydrochlorothiazide and take this once daily in addition to your other medicines.  Please check your blood pressure after sitting for 5 minutes and write it down.  You need to get your weight down.  Follow up with me in 2 months.  Get the sleep evaluation when you can.  Get the colon exam.    Preventive Care Advice   This is general advice we often give to help people stay healthy. Your care team may have specific advice just for you. Please talk to your care team about your own preventive care needs.  Lifestyle  Exercise at least 150 minutes each week (30 minutes a day, 5 days a week).  Do muscle strengthening activities 2 days a week. These help control your weight and prevent disease.  No smoking.  Wear sunscreen to prevent skin cancer.  Have your home tested for radon every 2 to 5 years. Radon is a colorless, odorless gas that can harm your lungs. To learn more, go to www.health.[facility].mn. and search for \"Radon in Homes.\"  Keep guns unloaded and locked up in a safe place like a safe or gun vault, or, use a gun lock and hide the keys. Always lock away bullets separately. To learn more, visit [employer].[place].gov and search for \"safe gun storage.\"  Nutrition  Eat 5 or more servings of fruits and vegetables each day.  Try wheat bread, brown rice and whole grain pasta (instead of white bread, rice, and pasta).  Get enough calcium and vitamin D. Check the label on foods and aim for 100% of the RDA (recommended daily allowance).  Regular exams  Have a dental exam and cleaning every 6 months.  See your health care team every year to talk about:  Any changes in your health.  Any medicines your care team has prescribed.  Preventive care, family planning, and ways to prevent chronic diseases.  Shots (vaccines)   HPV shots (up to age 26), if you've never had them before.  Hepatitis B shots (up " to age 59), if you've never had them before.  COVID-19 shot: Get this shot when it's due.  Flu shot: Get a flu shot every year.  Tetanus shot: Get a tetanus shot every 10 years.  Pneumococcal, hepatitis A, and RSV shots: Ask your care team if you need these based on your risk.  Shingles shot (for age 50 and up).  General health tests  Diabetes screening:  Starting at age 35, Get screened for diabetes at least every 3 years.  If you are younger than age 35, ask your care team if you should be screened for diabetes.  Cholesterol test: At age 39, start having a cholesterol test every 5 years, or more often if advised.  Bone density scan (DEXA): At age 50, ask your care team if you should have this scan for osteoporosis (brittle bones).  Hepatitis C: Get tested at least once in your life.  Abdominal aortic aneurysm screening: Talk to your doctor about having this screening if you:  Have ever smoked; and  Are biologically male; and  Are between the ages of 65 and 75.  STIs (sexually transmitted infections)  Before age 24: Ask your care team if you should be screened for STIs.  After age 24: Get screened for STIs if you're at risk. You are at risk for STIs (including HIV) if:  You are sexually active with more than one person.  You don't use condoms every time.  You or a partner was diagnosed with a sexually transmitted infection.  If you are at risk for HIV, ask about PrEP medicine to prevent HIV.  Get tested for HIV at least once in your life, whether you are at risk for HIV or not.  Cancer screening tests  Cervical cancer screening: If you have a cervix, begin getting regular cervical cancer screening tests at age 21. Most people who have regular screenings with normal results can stop after age 65. Talk about this with your provider.  Breast cancer scan (mammogram): If you've ever had breasts, begin having regular mammograms starting at age 40. This is a scan to check for breast cancer.  Colon cancer screening: It is  important to start screening for colon cancer at age 45.  Have a colonoscopy test every 10 years (or more often if you're at risk) Or, ask your provider about stool tests like a FIT test every year or Cologuard test every 3 years.  To learn more about your testing options, visit: www.citiservi/593268.pdf.  For help making a decision, visit: felicia/fl01362.  Prostate cancer screening test: If you have a prostate and are age 55 to 69, ask your provider if you would benefit from a yearly prostate cancer screening test.  Lung cancer screening: If you are a current or former smoker age 50 to 80, ask your care team if ongoing lung cancer screenings are right for you.  For informational purposes only. Not to replace the advice of your health care provider. Copyright   2023 [employer]. All rights reserved. Clinically reviewed by the [facility]. [place] 780459 - REV 04/24.    Learning About Stress  What is stress?     Stress is your body's response to a hard situation. Your body can have a physical, emotional, or mental response. Stress is a fact of life for most people, and it affects everyone differently. What causes stress for you may not be stressful for someone else.  A lot of things can cause stress. You may feel stress when you go on a job interview, take a test, or run a race. This kind of short-term stress is normal and even useful. It can help you if you need to work hard or react quickly. For example, stress can help you finish an important job on time.  Long-term stress is caused by ongoing stressful situations or events. Examples of long-term stress include long-term health problems, ongoing problems at work, or conflicts in your family. Long-term stress can harm your health.  How does stress affect your health?  When you are stressed, your body responds as though you are in danger. It makes hormones that speed up your heart, make you breathe faster, and give you a  burst of energy. This is called the fight-or-flight stress response. If the stress is over quickly, your body goes back to normal and no harm is done.  But if stress happens too often or lasts too long, it can have bad effects. Long-term stress can make you more likely to get sick, and it can make symptoms of some diseases worse. If you tense up when you are stressed, you may develop neck, shoulder, or low back pain. Stress is linked to high blood pressure and heart disease.  Stress also harms your emotional health. It can make you benavides, tense, or depressed. Your relationships may suffer, and you may not do well at work or school.  What can you do to manage stress?  You can try these things to help manage stress:   Do something active. Exercise or activity can help reduce stress. Walking is a great way to get started. Even everyday activities such as housecleaning or yard work can help.  Try yoga or valerie chi. These techniques combine exercise and meditation. You may need some training at first to learn them.  Do something you enjoy. For example, listen to music or go to a movie. Practice your hobby or do volunteer work.  Meditate. This can help you relax, because you are not worrying about what happened before or what may happen in the future.  Do guided imagery. Imagine yourself in any setting that helps you feel calm. You can use online videos, books, or a teacher to guide you.  Do breathing exercises. For example:  From a standing position, bend forward from the waist with your knees slightly bent. Let your arms dangle close to the floor.  Breathe in slowly and deeply as you return to a standing position. Roll up slowly and lift your head last.  Hold your breath for just a few seconds in the standing position.  Breathe out slowly and bend forward from the waist.  Let your feelings out. Talk, laugh, cry, and express anger when you need to. Talking with supportive friends or family, a counselor, or a flor leader  "about your feelings is a healthy way to relieve stress. Avoid discussing your feelings with people who make you feel worse.  Write. It may help to write about things that are bothering you. This helps you find out how much stress you feel and what is causing it. When you know this, you can find better ways to cope.  What can you do to prevent stress?  You might try some of these things to help prevent stress:  Manage your time. This helps you find time to do the things you want and need to do.  Get enough sleep. Your body recovers from the stresses of the day while you are sleeping.  Get support. Your family, friends, and community can make a difference in how you experience stress.  Limit your news feed. Avoid or limit time on social media or news that may make you feel stressed.  Do something active. Exercise or activity can help reduce stress. Walking is a great way to get started.  Where can you learn more?  Go to https://www.[employer].net/patiented  Enter N032 in the search box to learn more about \"Learning About Stress.\"  Current as of: October 24, 2023               Content Version: 14.0    8060-4519 [employer].   Care instructions adapted under license by your healthcare professional. If you have questions about a medical condition or this instruction, always ask your healthcare professional. [employer] disclaims any warranty or liability for your use of this information.      "

## 2024-08-06 ENCOUNTER — OFFICE VISIT (OUTPATIENT)
Dept: FAMILY MEDICINE | Facility: CLINIC | Age: 64
End: 2024-08-06
Payer: COMMERCIAL

## 2024-08-06 VITALS
HEART RATE: 62 BPM | OXYGEN SATURATION: 99 % | RESPIRATION RATE: 16 BRPM | DIASTOLIC BLOOD PRESSURE: 67 MMHG | SYSTOLIC BLOOD PRESSURE: 127 MMHG | HEIGHT: 69 IN | BODY MASS INDEX: 34.36 KG/M2 | WEIGHT: 232 LBS | TEMPERATURE: 97.7 F

## 2024-08-06 DIAGNOSIS — R73.9 ELEVATED BLOOD SUGAR: ICD-10-CM

## 2024-08-06 DIAGNOSIS — I10 BENIGN ESSENTIAL HYPERTENSION: Primary | ICD-10-CM

## 2024-08-06 LAB
ANION GAP SERPL CALCULATED.3IONS-SCNC: 10 MMOL/L (ref 7–15)
BUN SERPL-MCNC: 17.8 MG/DL (ref 8–23)
CALCIUM SERPL-MCNC: 9.1 MG/DL (ref 8.8–10.4)
CHLORIDE SERPL-SCNC: 103 MMOL/L (ref 98–107)
CREAT SERPL-MCNC: 1.14 MG/DL (ref 0.67–1.17)
EGFRCR SERPLBLD CKD-EPI 2021: 71 ML/MIN/1.73M2
GLUCOSE SERPL-MCNC: 163 MG/DL (ref 70–99)
HCO3 SERPL-SCNC: 25 MMOL/L (ref 22–29)
POTASSIUM SERPL-SCNC: 4.4 MMOL/L (ref 3.4–5.3)
SODIUM SERPL-SCNC: 138 MMOL/L (ref 135–145)

## 2024-08-06 PROCEDURE — 36415 COLL VENOUS BLD VENIPUNCTURE: CPT | Performed by: INTERNAL MEDICINE

## 2024-08-06 PROCEDURE — 99213 OFFICE O/P EST LOW 20 MIN: CPT | Performed by: INTERNAL MEDICINE

## 2024-08-06 PROCEDURE — 80048 BASIC METABOLIC PNL TOTAL CA: CPT | Performed by: INTERNAL MEDICINE

## 2024-08-06 ASSESSMENT — PAIN SCALES - GENERAL: PAINLEVEL: NO PAIN (0)

## 2024-08-06 NOTE — PROGRESS NOTES
This is a follow-up visit for hypertension.  As noted and reviewed I saw him on June 30 for his annual wellness visit.  At that time his blood pressure was high.  I added hydrochlorothiazide which she has been taking since then.  He has been checking his blood pressure at home and it has been super and today it is quite good here as well.  He urinates a little bit more.  He is not having dizziness or other symptoms or side effects.  His labs were reviewed from the [facility] visit and look quite good.  He is taking all his medicines regularly.      Past Medical History:   Diagnosis Date    Aortic root dilatation (H24) 2012    echo done 9/12 4.2 cm, ascending aorta mildy dilated at 3.9.  Nl lv fxn, mild lvh, lae, mild ai, mild mr and tr, fu 4/18 and stable; in 2022 sinus of valsalava 4.4cm    Ascending aortic aneurysm (H24) 2012    echo done 9/12 mildy dilated at 4.2.    Atrial fibrillation (H) 04/2018    seen on holter, added eliquis, then added toprol 9/18    Atrial flutter (H) 12/2015    seen on holter, 5 minutes, holter done due to prior heart surgery, also 2:1 heart block and first degree av block    Chronic cough     for years    Diastolic dysfunction 12/2015    on echo    Diplopia 2019    mri brain nl, carotid us less then 50% bilat, seen by ophtho and neuro and no cause found    GERD (gastroesophageal reflux disease)     Heart block 12/2015    seen on holter, 2:1 and first degree    HTN (hypertension) 2007    added hctz 6/2024    LVH (left ventricular hypertrophy) 2006    on echo done for elev bp, fu 2010 same, mod thickening mv, mild tr, trace to mild mr; fu 2015 same as 2013    Mild aortic regurgitation     Mild mitral regurgitation     fu 2015 no change    Nephrolithiasis 2007    calcium ox    Normal colonoscopy 2014    Personal history of surgery to heart and great vessels, presenting hazards to health 7th grade    hole in heart    Subclavian artery stenosis, left (H24) 2006    found due to bp difference in  arms    Vision changes 02/2019    carotid us neg, mri brain nl, echo unchanged, no clots     Past Surgical History:   Procedure Laterality Date    CARDIAC SURGERY  7th grade     hole in heart    LAPAROSCOPIC CHOLECYSTECTOMY  2/8/15    done gillian akbar gallbladder     Social History     Socioeconomic History    Marital status:      Spouse name: Not on file    Number of children: 2    Years of education: Not on file    Highest education level: Not on file   Occupational History    Occupation:      Employer: [employer]   Tobacco Use    Smoking status: Never    Smokeless tobacco: Never   Substance and Sexual Activity    Alcohol use: No     Alcohol/week: 0.0 standard drinks of alcohol    Drug use: No    Sexual activity: Yes     Partners: Female   Other Topics Concern    Parent/sibling w/ CABG, MI or angioplasty before 65F 55M? Not Asked   Social History Narrative    Not on file     Social Determinants of Health     Financial Resource Strain: Low Risk  (5/30/2024)    Financial Resource Strain     Within the past 12 months, have you or your family members you live with been unable to get utilities (heat, electricity) when it was really needed?: No   Food Insecurity: Low Risk  (5/30/2024)    Food Insecurity     Within the past 12 months, did you worry that your food would run out before you got money to buy more?: No     Within the past 12 months, did the food you bought just not last and you didn t have money to get more?: No   Transportation Needs: Low Risk  (5/30/2024)    Transportation Needs     Within the past 12 months, has lack of transportation kept you from medical appointments, getting your medicines, non-medical meetings or appointments, work, or from getting things that you need?: No   Physical Activity: Insufficiently Active (5/30/2024)    Exercise Vital Sign     Days of Exercise per Week: 4 days     Minutes of Exercise per Session: 30 min   Stress: No Stress Concern Present (5/30/2024)     [facility] of Occupational Health - Occupational Stress Questionnaire     Feeling of Stress : Only a little   Social Connections: Unknown (5/30/2024)    Social Connection and Isolation Panel [NHANES]     Frequency of Communication with Friends and Family: Not on file     Frequency of Social Gatherings with Friends and Family: Twice a week     Attends Evangelical Services: Not on file     Active Member of Clubs or Organizations: Not on file     Attends Club or Organization Meetings: Not on file     Marital Status: Not on file   Interpersonal Safety: Low Risk  (9/27/2023)    Interpersonal Safety     Do you feel physically and emotionally safe where you currently live?: Yes     Within the past 12 months, have you been hit, slapped, kicked or otherwise physically hurt by someone?: No     Within the past 12 months, have you been humiliated or emotionally abused in other ways by your partner or ex-partner?: No   Housing Stability: Low Risk  (5/30/2024)    Housing Stability     Do you have housing? : Yes     Are you worried about losing your housing?: No     Current Outpatient Medications   Medication Sig Dispense Refill    amLODIPine (NORVASC) 5 MG tablet Take 1 tablet (5 mg) by mouth daily 90 tablet 3    apixaban ANTICOAGULANT (ELIQUIS ANTICOAGULANT) 5 MG tablet Take 1 tablet (5 mg) by mouth 2 times daily 180 tablet 3    ASPIRIN NOT PRESCRIBED (INTENTIONAL) Please choose reason not prescribed, below 0 each 0    famotidine (PEPCID) 20 MG tablet Take 1 tablet (20 mg) by mouth 2 times daily 180 tablet 3    fluticasone (FLONASE) 50 MCG/ACT nasal spray USE 1 TO 2 SPRAYS INTO BOTH NOSTRILS ONCE DAILY. 48 g 3    hydrochlorothiazide (MICROZIDE) 12.5 MG capsule Take 1 capsule (12.5 mg) by mouth daily 90 capsule 3    metoprolol succinate ER (TOPROL XL) 25 MG 24 hr tablet Take 1 tablet (25 mg) by mouth daily 90 tablet 3     No Known Allergies  FAMILY HISTORY NOTED AND REVIEWED    REVIEW OF SYSTEMS: above    PHYSICAL  "EXAM    /67 (BP Location: Right arm, Patient Position: Sitting, Cuff Size: Adult Large)   Pulse 62   Temp 97.7  F (36.5  C) (Temporal)   Resp 16   Ht 1.753 m (5' 9\")   Wt 105.2 kg (232 lb)   SpO2 99%   BMI 34.26 kg/m      Patient appears non toxic  Lungs clear  Cardiovascular regular rate and rhythm    Labs sent    ASSESSMENT:  Hypertension, much better control    PLAN:  Check BMP today  He will monitor his blood pressure  Exercise, diet and weight loss    Shad Pineda M.D.        "

## 2024-08-07 NOTE — RESULT ENCOUNTER NOTE
Mr. Zhou,    It was very nice seeing you.  Your labs show normal blood salts and kidney tests.  Your sugar is high this time at 163.  I am a bit surprised but I do not think you were fasting.  Please work on exercise and getting your weight down to improve this and I would like to repeat your sugar in about 2 months.  Please remember to come back and do this.  You do not need to see me but schedule a lab only appointment on [facility].    Please let me know if you have questions.    Shad

## 2024-08-26 ENCOUNTER — TELEPHONE (OUTPATIENT)
Dept: FAMILY MEDICINE | Facility: CLINIC | Age: 64
End: 2024-08-26
Payer: COMMERCIAL

## 2024-08-26 NOTE — TELEPHONE ENCOUNTER
[facility] calling requesting hold and bridging orders for pt upcoming colonoscopy on 9/25/24. [facility] requesting a 2 day hold of eliquis, if not able to hold needs creatinine within 60 days.    Briseyda Harper RN

## 2024-08-29 ENCOUNTER — PATIENT OUTREACH (OUTPATIENT)
Dept: CARE COORDINATION | Facility: CLINIC | Age: 64
End: 2024-08-29
Payer: COMMERCIAL

## 2024-08-31 ENCOUNTER — PATIENT OUTREACH (OUTPATIENT)
Dept: CARE COORDINATION | Facility: CLINIC | Age: 64
End: 2024-08-31
Payer: COMMERCIAL

## 2024-09-01 ENCOUNTER — PATIENT OUTREACH (OUTPATIENT)
Dept: CARE COORDINATION | Facility: CLINIC | Age: 64
End: 2024-09-01
Payer: COMMERCIAL

## 2024-09-02 ENCOUNTER — PATIENT OUTREACH (OUTPATIENT)
Dept: CARE COORDINATION | Facility: CLINIC | Age: 64
End: 2024-09-02
Payer: COMMERCIAL

## 2024-09-03 ENCOUNTER — PATIENT OUTREACH (OUTPATIENT)
Dept: CARE COORDINATION | Facility: CLINIC | Age: 64
End: 2024-09-03
Payer: COMMERCIAL

## 2024-09-04 ENCOUNTER — PATIENT OUTREACH (OUTPATIENT)
Dept: CARE COORDINATION | Facility: CLINIC | Age: 64
End: 2024-09-04
Payer: COMMERCIAL

## 2024-09-05 ENCOUNTER — PATIENT OUTREACH (OUTPATIENT)
Dept: CARE COORDINATION | Facility: CLINIC | Age: 64
End: 2024-09-05
Payer: COMMERCIAL

## 2024-09-25 ENCOUNTER — TRANSFERRED RECORDS (OUTPATIENT)
Dept: HEALTH INFORMATION MANAGEMENT | Facility: CLINIC | Age: 64
End: 2024-09-25
Payer: COMMERCIAL

## 2024-11-02 ENCOUNTER — MYC MEDICAL ADVICE (OUTPATIENT)
Dept: FAMILY MEDICINE | Facility: CLINIC | Age: 64
End: 2024-11-02
Payer: COMMERCIAL

## 2024-11-04 NOTE — TELEPHONE ENCOUNTER
Abstracted   COVID Routine 11/01/2024 1 of 1 ComirnatyPFR 30mcg     Influenza 11/01/2024 Booster Fluad trivalent

## 2024-11-14 DIAGNOSIS — R05.3 CHRONIC COUGH: ICD-10-CM

## 2024-11-14 RX ORDER — FLUTICASONE PROPIONATE 50 MCG
SPRAY, SUSPENSION (ML) NASAL
Qty: 48 G | Refills: 0 | Status: SHIPPED | OUTPATIENT
Start: 2024-11-14

## 2024-12-23 ENCOUNTER — NURSE TRIAGE (OUTPATIENT)
Dept: FAMILY MEDICINE | Facility: CLINIC | Age: 64
End: 2024-12-23
Payer: COMMERCIAL

## 2024-12-23 NOTE — TELEPHONE ENCOUNTER
"Pt agreed to Disposition - to be seen in OFFICE TODAY or TOMORROW - and triage was able to schedule the following:    Dec 24, 2024 10:30 AM  (Arrive by 10:10 AM)  Provider Visit with Shad Pineda MD  [facility] ([facility] ) [phone]     Fara Jean RN      1. ONSET: \"When did the pain begin?\" (e.g., minutes, hours, days)      Middle last week  2. LOCATION: \"Where does it hurt?\" (upper, mid or lower back)      Right sided lower back pain, in soft tissue area (UA at [facility] was negative)  3. SEVERITY: \"How bad is the pain?\"  (e.g., Scale 1-10; mild, moderate, or severe)      3/10 during day, 8/10 when laying down (can't lay down flat or on sides)  4. PATTERN: \"Is the pain constant?\" (e.g., yes, no; constant, intermittent)       intermittent  5. RADIATION: \"Does the pain shoot into your legs or somewhere else?\"      Denies  6. CAUSE:  \"What do you think is causing the back pain?\"       Unknown  7. BACK OVERUSE:  \"Any recent lifting of heavy objects, strenuous work or exercise?\"      Pt was lifting/working on something over his head recently, also shoveling  8. MEDICINES: \"What have you taken so far for the pain?\" (e.g., nothing, acetaminophen, NSAIDS)      Tylenol 1000mg q6hr, flexeril TID, and icing - per [facility] ED  9. NEUROLOGIC SYMPTOMS: \"Do you have any weakness, numbness, or problems with bowel/bladder control?\"      Denies  10. OTHER SYMPTOMS: \"Do you have any other symptoms?\" (e.g., fever, abdomen pain, burning with urination, blood in urine)        Denies    Reason for Disposition   Age > 50 and no history of prior similar back pain    Additional Information   Negative: Passed out (e.g., fainted, lost consciousness, blacked out and was not responding)   Negative: Shock suspected (e.g., cold/pale/clammy skin, too weak to stand, low BP, rapid pulse)   Negative: Sounds like a life-threatening emergency to the triager   Negative: Major injury to " the back (e.g., MVA, fall > 10 feet or 3 meters, penetrating injury, etc.)   Negative: Pain in the upper back over the ribs (rib cage) that radiates (travels) into the chest   Negative: Pain in the upper back over the ribs (rib cage) and worsened by coughing (or clearly increases with breathing)   Negative: Back pain during pregnancy   Negative: SEVERE back pain of sudden onset and age > 60 years   Negative: SEVERE abdominal pain (e.g., excruciating)   Negative: Abdominal pain and age > 60 years   Negative: Unable to urinate (or only a few drops) and bladder feels very full   Negative: Loss of bladder or bowel control (urine or bowel incontinence; wetting self, leaking stool) of new-onset   Negative: Numbness (loss of sensation) in groin or rectal area   Negative: Pain radiates into groin, scrotum   Negative: Blood in urine (red, pink, or tea-colored)   Negative: Vomiting and pain over lower ribs of back (i.e., flank - kidney area)   Negative: Weakness of a leg or foot (e.g., unable to bear weight, dragging foot)   Negative: Patient sounds very sick or weak to the triager   Negative: Fever > 100.4 F (38.0 C) and flank pain   Negative: Pain or burning with passing urine (urination)   Negative: SEVERE back pain (e.g., excruciating, unable to do any normal activities) and not improved after pain medicine and CARE ADVICE   Negative: Numbness in an arm or hand (i.e., loss of sensation) and upper back pain   Negative: Numbness in a leg or foot (i.e., loss of sensation)   Negative: High-risk adult (e.g., history of cancer, history of HIV, or history of IV Drug Use)   Negative: Soft tissue infection (e.g., abscess, cellulitis) or other serious infection (e.g., bacteremia) in last 2 weeks   Negative: Painful rash with multiple small blisters grouped together (i.e., dermatomal distribution or 'band' or 'stripe')   Negative: Pain radiates into the thigh or further down the leg, and in both legs    Protocols used: Back  Pain-A-[place]

## 2024-12-24 ENCOUNTER — OFFICE VISIT (OUTPATIENT)
Dept: FAMILY MEDICINE | Facility: CLINIC | Age: 64
End: 2024-12-24
Payer: COMMERCIAL

## 2024-12-24 VITALS
RESPIRATION RATE: 16 BRPM | OXYGEN SATURATION: 97 % | SYSTOLIC BLOOD PRESSURE: 138 MMHG | BODY MASS INDEX: 34.21 KG/M2 | HEART RATE: 74 BPM | HEIGHT: 69 IN | WEIGHT: 231 LBS | DIASTOLIC BLOOD PRESSURE: 78 MMHG | TEMPERATURE: 97.7 F

## 2024-12-24 DIAGNOSIS — R73.9 ELEVATED BLOOD SUGAR: ICD-10-CM

## 2024-12-24 DIAGNOSIS — M54.50 ACUTE RIGHT-SIDED LOW BACK PAIN WITHOUT SCIATICA: Primary | ICD-10-CM

## 2024-12-24 LAB
EST. AVERAGE GLUCOSE BLD GHB EST-MCNC: 134 MG/DL
FASTING STATUS PATIENT QL REPORTED: NO
GLUCOSE SERPL-MCNC: 88 MG/DL (ref 70–99)
HBA1C MFR BLD: 6.3 % (ref 0–5.6)

## 2024-12-24 PROCEDURE — 99213 OFFICE O/P EST LOW 20 MIN: CPT | Performed by: INTERNAL MEDICINE

## 2024-12-24 PROCEDURE — 83036 HEMOGLOBIN GLYCOSYLATED A1C: CPT | Performed by: INTERNAL MEDICINE

## 2024-12-24 PROCEDURE — 82947 ASSAY GLUCOSE BLOOD QUANT: CPT | Performed by: INTERNAL MEDICINE

## 2024-12-24 PROCEDURE — 36415 COLL VENOUS BLD VENIPUNCTURE: CPT | Performed by: INTERNAL MEDICINE

## 2024-12-24 RX ORDER — CELECOXIB 200 MG/1
200 CAPSULE ORAL DAILY
Qty: 20 CAPSULE | Refills: 0 | Status: SHIPPED | OUTPATIENT
Start: 2024-12-24 | End: 2024-12-24

## 2024-12-24 RX ORDER — HYDROCODONE BITARTRATE AND ACETAMINOPHEN 5; 325 MG/1; MG/1
1 TABLET ORAL EVERY 8 HOURS PRN
Qty: 10 TABLET | Refills: 0 | Status: SHIPPED | OUTPATIENT
Start: 2024-12-24 | End: 2024-12-27

## 2024-12-24 ASSESSMENT — PAIN SCALES - GENERAL: PAINLEVEL_OUTOF10: SEVERE PAIN (6)

## 2024-12-24 NOTE — RESULT ENCOUNTER NOTE
Mr. Zhou,    Your diabetes test or hemoglobin A1c is elevated at 6.3 which indicates prediabetes.  It is very important that you try to get your weight down to prevent the development of diabetes.  I know this can be difficult but it really just involves calorie reduction which is never easy.  I would also encourage regular exercise.    Please let me know if you have questions.    Shad Pineda M.D.

## 2024-12-24 NOTE — PROGRESS NOTES
This is a follow-up from patient's ER visit at [facility] from December 20.  I reviewed that note.  The patient presented with right low back pain without injury or trauma but had done extensive shoveling the day before.  It was felt to be muscular in nature.  Tylenol and lidocaine patch was recommended and a prescription for Flexeril.  Ua was negative.    At this time the pain is not significantly better.  Issues of Flexeril and it did not really help.  Heat helps some.  Ice has helped some.  He is not having GI symptoms.  No fevers.  The pain is in the right low back and does not radiate.  No leg symptoms.  No nausea or vomiting.  Movements tend to make it worse.  When he sitting he is comfortable.    He also needs follow-up to the elevated sugar at his last visit.    Past Medical History:   Diagnosis Date    Aortic root dilatation (H) 2012    echo done 9/12 4.2 cm, ascending aorta mildy dilated at 3.9.  Nl lv fxn, mild lvh, lae, mild ai, mild mr and tr, fu 4/18 and stable; in 2022 sinus of valsalava 4.4cm    Ascending aortic aneurysm (H) 2012    echo done 9/12 mildy dilated at 4.2.    Atrial fibrillation (H) 04/2018    seen on holter, added eliquis, then added toprol 9/18    Atrial flutter (H) 12/2015    seen on holter, 5 minutes, holter done due to prior heart surgery, also 2:1 heart block and first degree av block    Chronic cough     for years    Diastolic dysfunction 12/2015    on echo    Diplopia 2019    mri brain nl, carotid us less then 50% bilat, seen by ophtho and neuro and no cause found    GERD (gastroesophageal reflux disease)     Heart block 12/2015    seen on holter, 2:1 and first degree    HTN (hypertension) 2007    added hctz 6/2024    LVH (left ventricular hypertrophy) 2006    on echo done for elev bp, fu 2010 same, mod thickening mv, mild tr, trace to mild mr; fu 2015 same as 2013    Mild aortic regurgitation     Mild mitral regurgitation     fu 2015 no change    Nephrolithiasis 2007    calcium  ox    Normal colonoscopy 2014    Personal history of surgery to heart and great vessels, presenting hazards to health 7th grade    hole in heart    Subclavian artery stenosis, left (H) 2006    found due to bp difference in arms    Vision changes 02/2019    carotid us neg, mri brain nl, echo unchanged, no clots     Past Surgical History:   Procedure Laterality Date    CARDIAC SURGERY  7th grade     hole in heart    LAPAROSCOPIC CHOLECYSTECTOMY  2/8/15    done gillian akbar gallbladder     Social History     Socioeconomic History    Marital status:      Spouse name: Not on file    Number of children: 2    Years of education: Not on file    Highest education level: Not on file   Occupational History    Occupation: [employer]     Employer: [employer] | [employer]   Tobacco Use    Smoking status: Never    Smokeless tobacco: Never   Vaping Use    Vaping status: Never Used   Substance and Sexual Activity    Alcohol use: No     Alcohol/week: 0.0 standard drinks of alcohol    Drug use: No    Sexual activity: Yes     Partners: Female   Other Topics Concern    Parent/sibling w/ CABG, MI or angioplasty before 65F 55M? Not Asked   Social History Narrative    Not on file     Social Drivers of Health     Financial Resource Strain: Low Risk  (5/30/2024)    Financial Resource Strain     Within the past 12 months, have you or your family members you live with been unable to get utilities (heat, electricity) when it was really needed?: No   Food Insecurity: Low Risk  (5/30/2024)    Food Insecurity     Within the past 12 months, did you worry that your food would run out before you got money to buy more?: No     Within the past 12 months, did the food you bought just not last and you didn t have money to get more?: No   Transportation Needs: Low Risk  (5/30/2024)    Transportation Needs     Within the past 12 months, has lack of transportation kept you from medical appointments, getting your medicines, non-medical meetings or  appointments, work, or from getting things that you need?: No   Physical Activity: Insufficiently Active (5/30/2024)    Exercise Vital Sign     Days of Exercise per Week: 4 days     Minutes of Exercise per Session: 30 min   Stress: No Stress Concern Present (5/30/2024)    [place] [employer] - Occupational Stress Questionnaire     Feeling of Stress : Only a little   Social Connections: Unknown (5/30/2024)    Social Connection and Isolation Panel [NHANES]     Frequency of Communication with Friends and Family: Not on file     Frequency of Social Gatherings with Friends and Family: Twice a week     Attends Buddhist Services: Not on file     Active Member of Clubs or Organizations: Not on file     Attends Club or Organization Meetings: Not on file     Marital Status: Not on file   Interpersonal Safety: Low Risk  (12/24/2024)    Interpersonal Safety     Do you feel physically and emotionally safe where you currently live?: Yes     Within the past 12 months, have you been hit, slapped, kicked or otherwise physically hurt by someone?: No     Within the past 12 months, have you been humiliated or emotionally abused in other ways by your partner or ex-partner?: No   Housing Stability: Low Risk  (5/30/2024)    Housing Stability     Do you have housing? : Yes     Are you worried about losing your housing?: No     Current Outpatient Medications   Medication Sig Dispense Refill    amLODIPine (NORVASC) 5 MG tablet Take 1 tablet (5 mg) by mouth daily 90 tablet 3    apixaban ANTICOAGULANT (ELIQUIS ANTICOAGULANT) 5 MG tablet Take 1 tablet (5 mg) by mouth 2 times daily 180 tablet 3    ASPIRIN NOT PRESCRIBED (INTENTIONAL) Please choose reason not prescribed, below 0 each 0    celecoxib (CELEBREX) 200 MG capsule Take 1 capsule (200 mg) by mouth daily. 20 capsule 0    famotidine (PEPCID) 20 MG tablet Take 1 tablet (20 mg) by mouth 2 times daily 180 tablet 3    fluticasone (FLONASE) 50 MCG/ACT nasal spray APPLY ONE  "OR TWO SPRAYS INTO BOTH NOSTRILS DAILY 48 g 0    hydrochlorothiazide (MICROZIDE) 12.5 MG capsule Take 1 capsule (12.5 mg) by mouth daily 90 capsule 3    metoprolol succinate ER (TOPROL XL) 25 MG 24 hr tablet Take 1 tablet (25 mg) by mouth daily 90 tablet 3     No Known Allergies  FAMILY HISTORY NOTED AND REVIEWED    REVIEW OF SYSTEMS: above    PHYSICAL EXAM    /78 (BP Location: Left arm, Patient Position: Sitting, Cuff Size: Adult Large)   Pulse 74   Temp 97.7  F (36.5  C) (Temporal)   Resp 16   Ht 1.753 m (5' 9\")   Wt 104.8 kg (231 lb)   SpO2 97%   BMI 34.11 kg/m      Patient appears non toxic  No back lesions or significant tenderness  Lungs clear  Cardiovascular regular rhythm no murmur rub or gallop  Abdomen nontender no masses    ASSESSMENT:  Right low back pain, most consistent with musculoskeletal, doubt internal cause, stone, aneurysm, tumor etc.  Elevated sugar, follow-up labs today, he did eat breakfast    PLAN:  Heat 4 times daily  No lifting  Consider physical therapy  Vicodin, 1 every 8 hours as needed.  He is advised not to drive for 8 hours and it may make him drowsy.  He will call if new symptoms develop, he worsens, or it is not better in the next few days.  Will also check the sugar and A1c today.    Shad Pineda M.D.        "

## 2024-12-24 NOTE — PATIENT INSTRUCTIONS
Do not use the celebrex, but use the vicodin in it's place.  It may make you drowsy so be careful.  You can take 1 pill every 8 hours.  If the pain worsens or is not better soon let me know.    Shad Pineda M.D.

## 2025-02-09 DIAGNOSIS — R05.3 CHRONIC COUGH: ICD-10-CM

## 2025-02-10 RX ORDER — FLUTICASONE PROPIONATE 50 MCG
SPRAY, SUSPENSION (ML) NASAL
Qty: 48 G | Refills: 0 | OUTPATIENT
Start: 2025-02-10

## 2025-02-17 RX ORDER — FLUTICASONE PROPIONATE 50 MCG
SPRAY, SUSPENSION (ML) NASAL
Qty: 48 G | Refills: 0 | Status: SHIPPED | OUTPATIENT
Start: 2025-02-17

## 2025-02-17 NOTE — TELEPHONE ENCOUNTER
Patient called and is needing a refill of the nasal spray. Prescription and pharmacy is pended.    Sara TAPIA RN  [facility] Triage Team

## 2025-02-27 ENCOUNTER — TRANSFERRED RECORDS (OUTPATIENT)
Dept: HEALTH INFORMATION MANAGEMENT | Facility: CLINIC | Age: 65
End: 2025-02-27
Payer: COMMERCIAL

## 2025-03-19 ENCOUNTER — TRANSFERRED RECORDS (OUTPATIENT)
Dept: HEALTH INFORMATION MANAGEMENT | Facility: CLINIC | Age: 65
End: 2025-03-19
Payer: COMMERCIAL

## 2025-04-03 ENCOUNTER — OFFICE VISIT (OUTPATIENT)
Dept: FAMILY MEDICINE | Facility: CLINIC | Age: 65
End: 2025-04-03
Payer: COMMERCIAL

## 2025-04-03 VITALS
WEIGHT: 233 LBS | HEART RATE: 82 BPM | TEMPERATURE: 97.8 F | OXYGEN SATURATION: 96 % | SYSTOLIC BLOOD PRESSURE: 137 MMHG | HEIGHT: 69 IN | RESPIRATION RATE: 18 BRPM | DIASTOLIC BLOOD PRESSURE: 72 MMHG | BODY MASS INDEX: 34.51 KG/M2

## 2025-04-03 DIAGNOSIS — G89.29 CHRONIC PAIN OF LEFT KNEE: ICD-10-CM

## 2025-04-03 DIAGNOSIS — M25.562 CHRONIC PAIN OF LEFT KNEE: ICD-10-CM

## 2025-04-03 DIAGNOSIS — Z01.810 PRE-OPERATIVE CARDIOVASCULAR EXAMINATION: Primary | ICD-10-CM

## 2025-04-03 LAB — HGB BLD-MCNC: 16.3 G/DL (ref 13.3–17.7)

## 2025-04-03 RX ORDER — CETIRIZINE HYDROCHLORIDE 10 MG/1
10 TABLET ORAL
COMMUNITY

## 2025-04-03 RX ORDER — OMEPRAZOLE 20 MG/1
20 CAPSULE, DELAYED RELEASE ORAL
COMMUNITY

## 2025-04-03 NOTE — PROGRESS NOTES
Preoperative Evaluation  [facility]  [address], [address]  [place] [address]  Phone: [phone]  Primary Provider: Shad Pineda MD  Pre-op Performing Provider: Lizeth Pastrana MD  Apr 3, 2025           3/31/2025   Surgical Information   What procedure is being done? Pre-operation physical left knee surgery   Facility or Hospital where procedure/surgery will be performed: [facility]   Who is doing the procedure / surgery? Dr. Trever Hernandez   Date of surgery / procedure: April 25, 2025   Time of surgery / procedure: To be determined   Where do you plan to recover after surgery? at home with family     Fax number for surgical facility: [phone]    Assessment & Plan     The proposed surgical procedure is considered LOW risk.    Pre-operative cardiovascular examination  Chronic pain of left knee  - Hemoglobin       - No identified additional risk factors other than previously addressed    Antiplatelet or Anticoagulation Medication Instructions   - apixaban (Eliquis). DO NOT TAKE 5 days before surgery.     Additional Medication Instructions  Take all scheduled medications on the day of surgery    Recommendation  Approval given to proceed with proposed procedure, without further diagnostic evaluation.    Pete Warren is a 65 year old, presenting for the following:  Pre-Op Exam        HPI: patient presents to internal medicine clinic today for pre op cardiac evaluation for upcoming left knee ortho surgery.        3/31/2025   Pre-Op Questionnaire   Have you ever had a heart attack or stroke? No   Have you ever had surgery on your heart or blood vessels, such as a stent placement, a coronary artery bypass, or surgery on an artery in your head, neck, heart, or legs? (!) YES    Do you have chest pain with activity? No   Do you have a history of heart failure? No   Do you currently have a cold, bronchitis or symptoms of other infection? No   Do you have a cough, shortness  of breath, or wheezing? No   Do you or anyone in your family have previous history of blood clots? No   Do you or does anyone in your family have a serious bleeding problem such as prolonged bleeding following surgeries or cuts? No   Have you ever had problems with anemia or been told to take iron pills? No   Have you had any abnormal blood loss such as black, tarry or bloody stools? No   Have you ever had a blood transfusion? No   Are you willing to have a blood transfusion if it is medically needed before, during, or after your surgery? Yes   Have you or any of your relatives ever had problems with anesthesia? No   Do you have sleep apnea, excessive snoring or daytime drowsiness? (!) UNKNOWN   Do you have any artifical heart valves or other implanted medical devices like a pacemaker, defibrillator, or continuous glucose monitor? No   Do you have artificial joints? No   Are you allergic to latex? No     Health Care Directive  Patient does not have a Health Care Directive: Discussed advance care planning with patient; information given to patient to review.        Patient Active Problem List    Diagnosis Date Noted    Diplopia 2019     Priority: Medium     mri brain nl, carotid us less then 50% bilat, seen by ophtho and neuro and no cause found      Atrial fibrillation (H) 04/01/2018     Priority: Medium     seen on holter      Chronic cough      Priority: Medium     for years      Atrial flutter, unspecified type (H) 12/15/2016     Priority: Medium    Gastroesophageal reflux disease without esophagitis 12/15/2016     Priority: Medium    Heart block      Priority: Medium     seen on holter, 2:1 and first degree      Diastolic dysfunction      Priority: Medium     on echo      Mild aortic regurgitation      Priority: Medium    Mild mitral regurgitation      Priority: Medium    Ascending aortic aneurysm      Priority: Medium     echo done 9/12 mildy dilated at 4.2      Aortic root dilatation      Priority: Medium      echo done 9/12 4.2 cm, ascending aorta mildy dilated at 3.9.  Nl lv fxn, mild lvh, lae, mild ai, mild mr and tr      Benign essential hypertension      Priority: Medium    LVH (left ventricular hypertrophy)      Priority: Medium     on echo doenn for elev bp, fu 2010 same, mod thickening mv, mild tr, trace to mild mr      Subclavian artery stenosis, left      Priority: Medium     found due to bp difference in arms        Past Medical History:   Diagnosis Date    Aortic root dilatation 2012    echo done 9/12 4.2 cm, ascending aorta mildy dilated at 3.9.  Nl lv fxn, mild lvh, lae, mild ai, mild mr and tr, fu 4/18 and stable; in 2022 sinus of valsalava 4.4cm    Ascending aortic aneurysm 2012    echo done 9/12 mildy dilated at 4.2.    Atrial fibrillation (H) 04/2018    seen on holter, added eliquis, then added toprol 9/18    Atrial flutter (H) 12/2015    seen on holter, 5 minutes, holter done due to prior heart surgery, also 2:1 heart block and first degree av block    Chronic cough     for years    Diastolic dysfunction 12/2015    on echo    Diplopia 2019    mri brain nl, carotid us less then 50% bilat, seen by ophtho and neuro and no cause found    GERD (gastroesophageal reflux disease)     Heart block 12/2015    seen on holter, 2:1 and first degree    HTN (hypertension) 2007    added hctz 6/2024    LVH (left ventricular hypertrophy) 2006    on echo done for elev bp, fu 2010 same, mod thickening mv, mild tr, trace to mild mr; fu 2015 same as 2013    Mild aortic regurgitation     Mild mitral regurgitation     fu 2015 no change    Nephrolithiasis 2007    calcium ox    Normal colonoscopy 2014    Personal history of surgery to heart and great vessels, presenting hazards to health 7th grade    hole in heart    Subclavian artery stenosis, left 2006    found due to bp difference in arms    Vision changes 02/2019    carotid us neg, mri brain nl, echo unchanged, no clots     Past Surgical History:   Procedure Laterality Date  "   CARDIAC SURGERY  7th grade     hole in heart    LAPAROSCOPIC CHOLECYSTECTOMY  2/8/15    done naomie richjosé miguel gallbladder     Current Outpatient Medications   Medication Sig Dispense Refill    amLODIPine (NORVASC) 5 MG tablet Take 1 tablet (5 mg) by mouth daily 90 tablet 3    apixaban ANTICOAGULANT (ELIQUIS ANTICOAGULANT) 5 MG tablet Take 1 tablet (5 mg) by mouth 2 times daily 180 tablet 3    ASPIRIN NOT PRESCRIBED (INTENTIONAL) Please choose reason not prescribed, below 0 each 0    cetirizine (ZYRTEC) 10 MG tablet Take 10 mg by mouth.      fluticasone (FLONASE) 50 MCG/ACT nasal spray APPLY ONE OR TWO SPRAYS INTO BOTH NOSTRILS DAILY 48 g 0    hydrochlorothiazide (MICROZIDE) 12.5 MG capsule Take 1 capsule (12.5 mg) by mouth daily 90 capsule 3    metoprolol succinate ER (TOPROL XL) 25 MG 24 hr tablet Take 1 tablet (25 mg) by mouth daily 90 tablet 3    omeprazole (PRILOSEC) 20 MG DR capsule Take 20 mg by mouth.         No Known Allergies     Social History     Tobacco Use    Smoking status: Never    Smokeless tobacco: Never   Substance Use Topics    Alcohol use: No     Family History   Problem Relation Age of Onset    C.A.D. Father      History   Drug Use No             Review of Systems  Constitutional, HEENT, cardiovascular, pulmonary, GI, , musculoskeletal, neuro, skin, endocrine and psych systems are negative, except as otherwise noted.    Objective    /72 (BP Location: Left arm, Patient Position: Sitting, Cuff Size: Adult Large)   Pulse 82   Temp 97.8  F (36.6  C) (Oral)   Resp 18   Ht 1.753 m (5' 9\")   Wt 105.7 kg (233 lb)   SpO2 96%   BMI 34.41 kg/m     Estimated body mass index is 34.41 kg/m  as calculated from the following:    Height as of this encounter: 1.753 m (5' 9\").    Weight as of this encounter: 105.7 kg (233 lb).  Physical Exam  GENERAL: alert and no distress  EYES: Eyes grossly normal to inspection, conjunctivae and sclerae normal  HENT: normocephalic atraumatic, nose and mouth without " ulcers or lesions  NECK: no asymmetry or scars  RESP: lungs clear to auscultation - no rales, rhonchi or wheezes  CV: chronic atrial fibrillation noted, heart rate is well controlled, S1 S2  ABDOMEN: soft, nontender, no hepatosplenomegaly, no masses and bowel sounds normal  MS: mechanical left knee pains noted, no edema  SKIN: no suspicious lesions or rashes  NEURO: Normal strength and tone, mentation intact and speech normal  PSYCH: mentation appears normal, affect normal/bright    Recent Labs   Lab Test 12/24/24  1055 08/06/24  0843 06/03/24  1012   HGB  --   --  15.9   PLT  --   --  170   NA  --  138 139   POTASSIUM  --  4.4 4.8   CR  --  1.14 1.14   A1C 6.3*  --   --         Diagnostics  Recent Results (from the past 24 hours)   Hemoglobin    Collection Time: 04/03/25 11:29 AM   Result Value Ref Range    Hemoglobin 16.3 13.3 - 17.7 g/dL      The longitudinal plan of care for the diagnosis(es)/condition(s) as documented were addressed during this visit. Due to the added complexity in care, I will continue to support Kelvin in the subsequent management and with ongoing continuity of care.      Signed Electronically by: Lizeth Psatrana MD  A copy of this evaluation report is provided to the requesting physician.

## 2025-04-24 ENCOUNTER — TELEPHONE (OUTPATIENT)
Dept: FAMILY MEDICINE | Facility: CLINIC | Age: 65
End: 2025-04-24

## 2025-04-24 ENCOUNTER — LAB (OUTPATIENT)
Dept: LAB | Facility: CLINIC | Age: 65
End: 2025-04-24
Payer: COMMERCIAL

## 2025-04-24 ENCOUNTER — ALLIED HEALTH/NURSE VISIT (OUTPATIENT)
Dept: FAMILY MEDICINE | Facility: CLINIC | Age: 65
End: 2025-04-24
Payer: COMMERCIAL

## 2025-04-24 DIAGNOSIS — Z01.810 PRE-OPERATIVE CARDIOVASCULAR EXAMINATION: ICD-10-CM

## 2025-04-24 DIAGNOSIS — Z01.810 PRE-OPERATIVE CARDIOVASCULAR EXAMINATION: Primary | ICD-10-CM

## 2025-04-24 LAB
BASOPHILS # BLD AUTO: 0 10E3/UL (ref 0–0.2)
BASOPHILS NFR BLD AUTO: 1 %
EOSINOPHIL # BLD AUTO: 0.1 10E3/UL (ref 0–0.7)
EOSINOPHIL NFR BLD AUTO: 1 %
ERYTHROCYTE [DISTWIDTH] IN BLOOD BY AUTOMATED COUNT: 14.3 % (ref 10–15)
HCT VFR BLD AUTO: 49.9 % (ref 40–53)
HGB BLD-MCNC: 16.3 G/DL (ref 13.3–17.7)
IMM GRANULOCYTES # BLD: 0 10E3/UL
IMM GRANULOCYTES NFR BLD: 0 %
LYMPHOCYTES # BLD AUTO: 1 10E3/UL (ref 0.8–5.3)
LYMPHOCYTES NFR BLD AUTO: 18 %
MCH RBC QN AUTO: 28.5 PG (ref 26.5–33)
MCHC RBC AUTO-ENTMCNC: 32.7 G/DL (ref 31.5–36.5)
MCV RBC AUTO: 87 FL (ref 78–100)
MONOCYTES # BLD AUTO: 0.7 10E3/UL (ref 0–1.3)
MONOCYTES NFR BLD AUTO: 13 %
NEUTROPHILS # BLD AUTO: 3.8 10E3/UL (ref 1.6–8.3)
NEUTROPHILS NFR BLD AUTO: 67 %
PLATELET # BLD AUTO: 153 10E3/UL (ref 150–450)
RBC # BLD AUTO: 5.72 10E6/UL (ref 4.4–5.9)
WBC # BLD AUTO: 5.6 10E3/UL (ref 4–11)

## 2025-04-24 NOTE — TELEPHONE ENCOUNTER
Pt seen for pre-op w Dr Pastrana 04/03. Needs CBC, BMP and EKG done today. Surgery tmw.  Minda Oneil, CMA

## 2025-04-24 NOTE — PROGRESS NOTES
EKG completed. Reviewed by ordering provider - Dr Pastrana. Ok for pt to leave. Faxed to surgery team along with labs.  Minda Oneil, CMA

## 2025-04-24 NOTE — TELEPHONE ENCOUNTER
FYI - Status Update    Who is Calling: patient    Update: Patient states he is having knee surgery done tomorrow morning and he is being asked to have a CBC, BMP and EKG done today prior to the surgery. Please call soon. Thank you.     Does caller want a call/response back: Yes     Could we send this information to you in [employer] or would you prefer to receive a phone call?:   Patient would prefer a phone call   Okay to leave a detailed message?: Yes at Cell number on file:    Telephone Information:   Mobile [phone]

## 2025-05-06 ENCOUNTER — MYC MEDICAL ADVICE (OUTPATIENT)
Dept: FAMILY MEDICINE | Facility: CLINIC | Age: 65
End: 2025-05-06
Payer: COMMERCIAL

## 2025-05-06 DIAGNOSIS — I10 BENIGN ESSENTIAL HYPERTENSION: ICD-10-CM

## 2025-05-06 RX ORDER — HYDROCHLOROTHIAZIDE 12.5 MG/1
12.5 CAPSULE ORAL DAILY
Qty: 90 CAPSULE | Refills: 0 | Status: SHIPPED | OUTPATIENT
Start: 2025-05-06

## 2025-05-08 ENCOUNTER — TRANSFERRED RECORDS (OUTPATIENT)
Dept: HEALTH INFORMATION MANAGEMENT | Facility: CLINIC | Age: 65
End: 2025-05-08
Payer: COMMERCIAL

## 2025-06-06 PROBLEM — H53.2 DIPLOPIA: Status: RESOLVED | Noted: 2019-01-01 | Resolved: 2025-06-06

## 2025-06-06 PROBLEM — E66.812 CLASS 2 OBESITY WITH ALVEOLAR HYPOVENTILATION, SERIOUS COMORBIDITY, AND BODY MASS INDEX (BMI) OF 35.0 TO 35.9 IN ADULT (H): Status: ACTIVE | Noted: 2020-10-12

## 2025-06-06 PROBLEM — D12.6 TUBULAR ADENOMA OF COLON: Status: ACTIVE | Noted: 2024-01-01

## 2025-06-06 PROBLEM — E66.2 CLASS 2 OBESITY WITH ALVEOLAR HYPOVENTILATION, SERIOUS COMORBIDITY, AND BODY MASS INDEX (BMI) OF 35.0 TO 35.9 IN ADULT (H): Status: ACTIVE | Noted: 2020-10-12

## 2025-06-06 PROBLEM — E66.812 CLASS 2 OBESITY WITH ALVEOLAR HYPOVENTILATION, SERIOUS COMORBIDITY, AND BODY MASS INDEX (BMI) OF 35.0 TO 35.9 IN ADULT (H): Status: RESOLVED | Noted: 2020-10-12 | Resolved: 2025-06-06

## 2025-06-06 PROBLEM — E66.2 CLASS 2 OBESITY WITH ALVEOLAR HYPOVENTILATION, SERIOUS COMORBIDITY, AND BODY MASS INDEX (BMI) OF 35.0 TO 35.9 IN ADULT (H): Status: RESOLVED | Noted: 2020-10-12 | Resolved: 2025-06-06

## 2025-06-09 ENCOUNTER — PATIENT OUTREACH (OUTPATIENT)
Dept: CARE COORDINATION | Facility: CLINIC | Age: 65
End: 2025-06-09
Payer: COMMERCIAL

## 2025-06-09 ENCOUNTER — PATIENT OUTREACH (OUTPATIENT)
Dept: GASTROENTEROLOGY | Facility: CLINIC | Age: 65
End: 2025-06-09
Payer: COMMERCIAL

## 2025-06-11 ENCOUNTER — PATIENT OUTREACH (OUTPATIENT)
Dept: CARE COORDINATION | Facility: CLINIC | Age: 65
End: 2025-06-11
Payer: COMMERCIAL

## 2025-07-10 ENCOUNTER — TRANSFERRED RECORDS (OUTPATIENT)
Dept: HEALTH INFORMATION MANAGEMENT | Facility: CLINIC | Age: 65
End: 2025-07-10
Payer: COMMERCIAL

## 2025-08-06 DIAGNOSIS — K21.9 GASTROESOPHAGEAL REFLUX DISEASE WITHOUT ESOPHAGITIS: ICD-10-CM

## 2025-08-07 RX ORDER — FAMOTIDINE 20 MG/1
20 TABLET, FILM COATED ORAL 2 TIMES DAILY
Qty: 180 TABLET | Refills: 3 | Status: SHIPPED | OUTPATIENT
Start: 2025-08-07

## 2025-09-02 ENCOUNTER — NURSE TRIAGE (OUTPATIENT)
Dept: FAMILY MEDICINE | Facility: CLINIC | Age: 65
End: 2025-09-02
Payer: COMMERCIAL

## 2025-09-03 ENCOUNTER — OFFICE VISIT (OUTPATIENT)
Dept: FAMILY MEDICINE | Facility: CLINIC | Age: 65
End: 2025-09-03
Payer: COMMERCIAL

## 2025-09-03 VITALS
WEIGHT: 235.5 LBS | RESPIRATION RATE: 10 BRPM | HEIGHT: 67 IN | SYSTOLIC BLOOD PRESSURE: 138 MMHG | OXYGEN SATURATION: 98 % | DIASTOLIC BLOOD PRESSURE: 83 MMHG | BODY MASS INDEX: 36.96 KG/M2 | HEART RATE: 65 BPM | TEMPERATURE: 97 F

## 2025-09-03 DIAGNOSIS — I10 BENIGN ESSENTIAL HYPERTENSION: ICD-10-CM

## 2025-09-03 DIAGNOSIS — M10.9 ACUTE GOUT INVOLVING TOE OF LEFT FOOT, UNSPECIFIED CAUSE: Primary | ICD-10-CM

## 2025-09-03 PROCEDURE — 3075F SYST BP GE 130 - 139MM HG: CPT | Performed by: PHYSICIAN ASSISTANT

## 2025-09-03 PROCEDURE — 99213 OFFICE O/P EST LOW 20 MIN: CPT | Performed by: PHYSICIAN ASSISTANT

## 2025-09-03 PROCEDURE — 3079F DIAST BP 80-89 MM HG: CPT | Performed by: PHYSICIAN ASSISTANT

## 2025-09-03 PROCEDURE — 1125F AMNT PAIN NOTED PAIN PRSNT: CPT | Performed by: PHYSICIAN ASSISTANT

## 2025-09-03 RX ORDER — PREDNISONE 20 MG/1
40 TABLET ORAL
Qty: 10 TABLET | Refills: 0 | Status: SHIPPED | OUTPATIENT
Start: 2025-09-03 | End: 2025-09-08

## 2025-09-03 ASSESSMENT — PAIN SCALES - GENERAL: PAINLEVEL_OUTOF10: MILD PAIN (3)
